# Patient Record
Sex: FEMALE | Race: BLACK OR AFRICAN AMERICAN | NOT HISPANIC OR LATINO | URBAN - METROPOLITAN AREA
[De-identification: names, ages, dates, MRNs, and addresses within clinical notes are randomized per-mention and may not be internally consistent; named-entity substitution may affect disease eponyms.]

---

## 2017-01-05 PROBLEM — Z00.00 ENCOUNTER FOR PREVENTIVE HEALTH EXAMINATION: Status: ACTIVE | Noted: 2017-01-05

## 2017-12-15 ENCOUNTER — INPATIENT (INPATIENT)
Facility: HOSPITAL | Age: 82
LOS: 3 days | Discharge: HOME | DRG: 571 | End: 2017-12-19
Attending: INTERNAL MEDICINE | Admitting: INTERNAL MEDICINE
Payer: MEDICARE

## 2017-12-15 VITALS
OXYGEN SATURATION: 97 % | HEART RATE: 76 BPM | RESPIRATION RATE: 18 BRPM | TEMPERATURE: 100 F | SYSTOLIC BLOOD PRESSURE: 159 MMHG | DIASTOLIC BLOOD PRESSURE: 94 MMHG

## 2017-12-15 DIAGNOSIS — L03.119 CELLULITIS OF UNSPECIFIED PART OF LIMB: ICD-10-CM

## 2017-12-15 DIAGNOSIS — I48.91 UNSPECIFIED ATRIAL FIBRILLATION: ICD-10-CM

## 2017-12-15 DIAGNOSIS — I10 ESSENTIAL (PRIMARY) HYPERTENSION: ICD-10-CM

## 2017-12-15 DIAGNOSIS — L03.90 CELLULITIS, UNSPECIFIED: ICD-10-CM

## 2017-12-15 LAB
ALBUMIN SERPL ELPH-MCNC: 3.9 G/DL — SIGNIFICANT CHANGE UP (ref 3.3–5)
ALP SERPL-CCNC: 98 U/L — SIGNIFICANT CHANGE UP (ref 40–120)
ALT FLD-CCNC: 6 U/L RC — LOW (ref 10–45)
ANION GAP SERPL CALC-SCNC: 14 MMOL/L — SIGNIFICANT CHANGE UP (ref 5–17)
AST SERPL-CCNC: 14 U/L — SIGNIFICANT CHANGE UP (ref 10–40)
BASE EXCESS BLDV CALC-SCNC: 0.1 MMOL/L — SIGNIFICANT CHANGE UP (ref -2–2)
BASOPHILS # BLD AUTO: 0.1 K/UL — SIGNIFICANT CHANGE UP (ref 0–0.2)
BASOPHILS NFR BLD AUTO: 0.6 % — SIGNIFICANT CHANGE UP (ref 0–2)
BILIRUB SERPL-MCNC: 0.6 MG/DL — SIGNIFICANT CHANGE UP (ref 0.2–1.2)
BUN SERPL-MCNC: 14 MG/DL — SIGNIFICANT CHANGE UP (ref 7–23)
CA-I SERPL-SCNC: 1.23 MMOL/L — SIGNIFICANT CHANGE UP (ref 1.12–1.3)
CALCIUM SERPL-MCNC: 9.7 MG/DL — SIGNIFICANT CHANGE UP (ref 8.4–10.5)
CHLORIDE BLDV-SCNC: 102 MMOL/L — SIGNIFICANT CHANGE UP (ref 96–108)
CHLORIDE SERPL-SCNC: 100 MMOL/L — SIGNIFICANT CHANGE UP (ref 96–108)
CO2 BLDV-SCNC: 26 MMOL/L — SIGNIFICANT CHANGE UP (ref 22–30)
CO2 SERPL-SCNC: 23 MMOL/L — SIGNIFICANT CHANGE UP (ref 22–31)
CREAT SERPL-MCNC: 0.99 MG/DL — SIGNIFICANT CHANGE UP (ref 0.5–1.3)
EOSINOPHIL # BLD AUTO: 0 K/UL — SIGNIFICANT CHANGE UP (ref 0–0.5)
EOSINOPHIL NFR BLD AUTO: 0.1 % — SIGNIFICANT CHANGE UP (ref 0–6)
GAS PNL BLDV: 138 MMOL/L — SIGNIFICANT CHANGE UP (ref 136–145)
GAS PNL BLDV: SIGNIFICANT CHANGE UP
GLUCOSE BLDV-MCNC: 117 MG/DL — HIGH (ref 70–99)
GLUCOSE SERPL-MCNC: 114 MG/DL — HIGH (ref 70–99)
HCO3 BLDV-SCNC: 25 MMOL/L — SIGNIFICANT CHANGE UP (ref 21–29)
HCT VFR BLD CALC: 40.6 % — SIGNIFICANT CHANGE UP (ref 34.5–45)
HCT VFR BLDA CALC: 38 % — LOW (ref 39–50)
HGB BLD CALC-MCNC: 12.4 G/DL — SIGNIFICANT CHANGE UP (ref 11.5–15.5)
HGB BLD-MCNC: 12.8 G/DL — SIGNIFICANT CHANGE UP (ref 11.5–15.5)
LACTATE BLDV-MCNC: 2.2 MMOL/L — HIGH (ref 0.7–2)
LYMPHOCYTES # BLD AUTO: 1.2 K/UL — SIGNIFICANT CHANGE UP (ref 1–3.3)
LYMPHOCYTES # BLD AUTO: 15 % — SIGNIFICANT CHANGE UP (ref 13–44)
MCHC RBC-ENTMCNC: 27.4 PG — SIGNIFICANT CHANGE UP (ref 27–34)
MCHC RBC-ENTMCNC: 31.6 GM/DL — LOW (ref 32–36)
MCV RBC AUTO: 86.7 FL — SIGNIFICANT CHANGE UP (ref 80–100)
MONOCYTES # BLD AUTO: 0.7 K/UL — SIGNIFICANT CHANGE UP (ref 0–0.9)
MONOCYTES NFR BLD AUTO: 8.7 % — SIGNIFICANT CHANGE UP (ref 2–14)
NEUTROPHILS # BLD AUTO: 6.3 K/UL — SIGNIFICANT CHANGE UP (ref 1.8–7.4)
NEUTROPHILS NFR BLD AUTO: 75.5 % — SIGNIFICANT CHANGE UP (ref 43–77)
PCO2 BLDV: 44 MMHG — SIGNIFICANT CHANGE UP (ref 35–50)
PH BLDV: 7.37 — SIGNIFICANT CHANGE UP (ref 7.35–7.45)
PLATELET # BLD AUTO: 170 K/UL — SIGNIFICANT CHANGE UP (ref 150–400)
PO2 BLDV: 43 MMHG — SIGNIFICANT CHANGE UP (ref 25–45)
POTASSIUM BLDV-SCNC: 5.3 MMOL/L — HIGH (ref 3.5–5)
POTASSIUM SERPL-MCNC: 4.7 MMOL/L — SIGNIFICANT CHANGE UP (ref 3.5–5.3)
POTASSIUM SERPL-SCNC: 4.7 MMOL/L — SIGNIFICANT CHANGE UP (ref 3.5–5.3)
PROT SERPL-MCNC: 8 G/DL — SIGNIFICANT CHANGE UP (ref 6–8.3)
RBC # BLD: 4.68 M/UL — SIGNIFICANT CHANGE UP (ref 3.8–5.2)
RBC # FLD: 12.5 % — SIGNIFICANT CHANGE UP (ref 10.3–14.5)
SAO2 % BLDV: 72 % — SIGNIFICANT CHANGE UP (ref 67–88)
SODIUM SERPL-SCNC: 137 MMOL/L — SIGNIFICANT CHANGE UP (ref 135–145)
WBC # BLD: 8.3 K/UL — SIGNIFICANT CHANGE UP (ref 3.8–10.5)
WBC # FLD AUTO: 8.3 K/UL — SIGNIFICANT CHANGE UP (ref 3.8–10.5)

## 2017-12-15 PROCEDURE — 93971 EXTREMITY STUDY: CPT | Mod: 26

## 2017-12-15 PROCEDURE — 99285 EMERGENCY DEPT VISIT HI MDM: CPT | Mod: GC

## 2017-12-15 PROCEDURE — 73590 X-RAY EXAM OF LOWER LEG: CPT | Mod: 26,LT

## 2017-12-15 RX ORDER — ACETAMINOPHEN 500 MG
650 TABLET ORAL ONCE
Qty: 0 | Refills: 0 | Status: COMPLETED | OUTPATIENT
Start: 2017-12-15 | End: 2017-12-15

## 2017-12-15 RX ORDER — SPIRONOLACTONE 25 MG/1
1 TABLET, FILM COATED ORAL
Qty: 0 | Refills: 0 | COMMUNITY

## 2017-12-15 RX ORDER — SPIRONOLACTONE 25 MG/1
25 TABLET, FILM COATED ORAL DAILY
Qty: 0 | Refills: 0 | Status: DISCONTINUED | OUTPATIENT
Start: 2017-12-15 | End: 2017-12-19

## 2017-12-15 RX ORDER — BISOPROLOL FUMARATE 10 MG/1
1 TABLET, FILM COATED ORAL
Qty: 0 | Refills: 0 | COMMUNITY

## 2017-12-15 RX ORDER — BISOPROLOL FUMARATE 10 MG/1
0 TABLET, FILM COATED ORAL
Qty: 0 | Refills: 0 | COMMUNITY

## 2017-12-15 RX ORDER — RIVAROXABAN 15 MG-20MG
1 KIT ORAL
Qty: 0 | Refills: 0 | COMMUNITY

## 2017-12-15 RX ORDER — ACETAMINOPHEN 500 MG
2 TABLET ORAL
Qty: 0 | Refills: 0 | COMMUNITY

## 2017-12-15 RX ORDER — SPIRONOLACTONE 25 MG/1
0 TABLET, FILM COATED ORAL
Qty: 0 | Refills: 0 | COMMUNITY

## 2017-12-15 RX ORDER — RIVAROXABAN 15 MG-20MG
0 KIT ORAL
Qty: 0 | Refills: 0 | COMMUNITY

## 2017-12-15 RX ORDER — BRINZOLAMIDE/BRIMONIDINE TARTRATE 10; 2 MG/ML; MG/ML
1 SUSPENSION/ DROPS OPHTHALMIC
Qty: 0 | Refills: 0 | COMMUNITY

## 2017-12-15 RX ORDER — RIVAROXABAN 15 MG-20MG
20 KIT ORAL EVERY 24 HOURS
Qty: 0 | Refills: 0 | Status: DISCONTINUED | OUTPATIENT
Start: 2017-12-15 | End: 2017-12-19

## 2017-12-15 RX ORDER — ACETAMINOPHEN 500 MG
650 TABLET ORAL EVERY 6 HOURS
Qty: 0 | Refills: 0 | Status: DISCONTINUED | OUTPATIENT
Start: 2017-12-15 | End: 2017-12-19

## 2017-12-15 RX ORDER — BISOPROLOL FUMARATE 10 MG/1
10 TABLET, FILM COATED ORAL DAILY
Qty: 0 | Refills: 0 | Status: DISCONTINUED | OUTPATIENT
Start: 2017-12-15 | End: 2017-12-19

## 2017-12-15 RX ADMIN — Medication 100 MILLIGRAM(S): at 17:29

## 2017-12-15 RX ADMIN — Medication 650 MILLIGRAM(S): at 22:20

## 2017-12-15 RX ADMIN — RIVAROXABAN 20 MILLIGRAM(S): KIT at 23:25

## 2017-12-15 NOTE — ED ADULT NURSE NOTE - OBJECTIVE STATEMENT
83 yo female presents to ED with c/o left lower leg cellulitis, sent by PMD. Patient is A&O x3. Patient denies chest pain and SOB, fevers, chills, N/V/D. Abdomen is soft non tender, non distended. Patient reports hitting her left lower leg 3 weeks ago and developing a "black and blue" bruise. She states the bruise has evolved into the following presentation over 3 weeks: LLE is red, warm to touch with +2 pitting edema. There is a yellow and red fluid-filled blister on the anterior surface that she states "pops and refills again" daily. +2 pedal pulses present bilaterally. Capillary refill <2 seconds in the feet bilaterally. Skin is warm and dry, color appropriate for ethnicity. Patient reports 5/10 pain in the left foot. There is no acute distress at this time. Patients grandson is at the bedside. Safety and comfort maintained. will continue to monitor.

## 2017-12-15 NOTE — ED PROVIDER NOTE - SKIN WOUND TYPE
approx 6cm area of abrasion, erythema surrounding it, over anterior left shin; warm to touch, no acitve drainage/ABRASION(S)

## 2017-12-15 NOTE — ED PROCEDURE NOTE - PROCEDURE ADDITIONAL DETAILS
Attempted to perform LLE DVT study, was unable to visualize popliteal region (extensive swelling / edema and obese leg)  -Jerzy

## 2017-12-15 NOTE — H&P ADULT - ASSESSMENT
81yo female history of afib on xarelto, hypertension p/w left lower extremity erythema since tuesday. Pt. reports bruising left leg with a box. Pt. on levofloxacin since Wednesday by Dr. Reza, seen by PMD(Dr. Wesley friedman today and referred to ED. Pt. reports pain to the site with yellow drainage. Denies fevers, chest pain, sob, abdominal pain. Pt. reports pain with walking. Pt. taking tylenol with mild relief.

## 2017-12-15 NOTE — ED PROVIDER NOTE - OBJECTIVE STATEMENT
83yo female history of afib on xarelto, hypertension p/w left lower extremity erythema since tuesday. Pt. reports bruising left leg with a box. Pt. on levofloxacin since Wednesday by Dr. Reza, seen by PMD(Dr. Wesley friedman today and referred to ED. Pt. reports pain to the site with yellow drainage. Denies fevers, chest pain, sob, abdominal pain. Pt. reports pain with walking. Pt. taking tylenol with mild relief.

## 2017-12-15 NOTE — ED PROVIDER NOTE - MEDICAL DECISION MAKING DETAILS
81yo F presenting with cellulitis of the LLE failing outpatient ABx; will admit for wound care, iv antibiotics and further evaluation/management.

## 2017-12-15 NOTE — ED ADULT NURSE REASSESSMENT NOTE - NS ED NURSE REASSESS COMMENT FT1
Patient sitting up in bed. She returned from ultrasound. Family is at bedside. She is a&ox4. She is c/o left lower leg pain. On assessment, she has large area of redness with would to left shin anteriorly. Wound feels warm. Has abdominal pad placed on top of wound. Vitals are stable. She received bed, pending transport to room.

## 2017-12-15 NOTE — H&P ADULT - PROBLEM SELECTOR PLAN 1
IV abxs Clindamycin started in ED and will continue for now . ID consulted to help with Abxs . Wound care consulted. Doppler and xrays noted.

## 2017-12-15 NOTE — ED PROVIDER NOTE - ATTENDING CONTRIBUTION TO CARE
83yo F presenting with cellulitis of the LLE failing outpatient ABx; will admit for wound care, iv antibiotics and further evaluation/management.

## 2017-12-15 NOTE — PATIENT PROFILE ADULT. - TEACHING/LEARNING FACTORS INFLUENCE READINESS TO LEARN
Face to face report given with opportunity to observe patient.    Report given to Milvia LEROY and Belinda Baldwin   9/22/2017  7:36 PM       acuteness of illness

## 2017-12-16 LAB
ANION GAP SERPL CALC-SCNC: 11 MMOL/L — SIGNIFICANT CHANGE UP (ref 5–17)
BUN SERPL-MCNC: 13 MG/DL — SIGNIFICANT CHANGE UP (ref 7–23)
CALCIUM SERPL-MCNC: 9.8 MG/DL — SIGNIFICANT CHANGE UP (ref 8.4–10.5)
CHLORIDE SERPL-SCNC: 102 MMOL/L — SIGNIFICANT CHANGE UP (ref 96–108)
CO2 SERPL-SCNC: 23 MMOL/L — SIGNIFICANT CHANGE UP (ref 22–31)
CREAT SERPL-MCNC: 1.03 MG/DL — SIGNIFICANT CHANGE UP (ref 0.5–1.3)
GLUCOSE BLDC GLUCOMTR-MCNC: 153 MG/DL — HIGH (ref 70–99)
GLUCOSE SERPL-MCNC: 95 MG/DL — SIGNIFICANT CHANGE UP (ref 70–99)
HCT VFR BLD CALC: 37.1 % — SIGNIFICANT CHANGE UP (ref 34.5–45)
HGB BLD-MCNC: 11.3 G/DL — LOW (ref 11.5–15.5)
LACTATE SERPL-SCNC: 1.5 MMOL/L — SIGNIFICANT CHANGE UP (ref 0.7–2)
MCHC RBC-ENTMCNC: 25.3 PG — LOW (ref 27–34)
MCHC RBC-ENTMCNC: 30.5 GM/DL — LOW (ref 32–36)
MCV RBC AUTO: 83.2 FL — SIGNIFICANT CHANGE UP (ref 80–100)
PLATELET # BLD AUTO: 176 K/UL — SIGNIFICANT CHANGE UP (ref 150–400)
POTASSIUM SERPL-MCNC: 4.7 MMOL/L — SIGNIFICANT CHANGE UP (ref 3.5–5.3)
POTASSIUM SERPL-SCNC: 4.7 MMOL/L — SIGNIFICANT CHANGE UP (ref 3.5–5.3)
RBC # BLD: 4.46 M/UL — SIGNIFICANT CHANGE UP (ref 3.8–5.2)
RBC # FLD: 14 % — SIGNIFICANT CHANGE UP (ref 10.3–14.5)
SODIUM SERPL-SCNC: 136 MMOL/L — SIGNIFICANT CHANGE UP (ref 135–145)
WBC # BLD: 6.92 K/UL — SIGNIFICANT CHANGE UP (ref 3.8–10.5)
WBC # FLD AUTO: 6.92 K/UL — SIGNIFICANT CHANGE UP (ref 3.8–10.5)

## 2017-12-16 RX ORDER — TRAMADOL HYDROCHLORIDE 50 MG/1
25 TABLET ORAL ONCE
Qty: 0 | Refills: 0 | Status: DISCONTINUED | OUTPATIENT
Start: 2017-12-16 | End: 2017-12-16

## 2017-12-16 RX ORDER — VANCOMYCIN HCL 1 G
1000 VIAL (EA) INTRAVENOUS EVERY 12 HOURS
Qty: 0 | Refills: 0 | Status: DISCONTINUED | OUTPATIENT
Start: 2017-12-16 | End: 2017-12-16

## 2017-12-16 RX ORDER — LACTOBACILLUS ACIDOPHILUS 100MM CELL
1 CAPSULE ORAL DAILY
Qty: 0 | Refills: 0 | Status: DISCONTINUED | OUTPATIENT
Start: 2017-12-16 | End: 2017-12-19

## 2017-12-16 RX ORDER — VANCOMYCIN HCL 1 G
1000 VIAL (EA) INTRAVENOUS EVERY 24 HOURS
Qty: 0 | Refills: 0 | Status: DISCONTINUED | OUTPATIENT
Start: 2017-12-16 | End: 2017-12-18

## 2017-12-16 RX ADMIN — Medication 250 MILLIGRAM(S): at 22:06

## 2017-12-16 RX ADMIN — RIVAROXABAN 20 MILLIGRAM(S): KIT at 19:08

## 2017-12-16 RX ADMIN — SPIRONOLACTONE 25 MILLIGRAM(S): 25 TABLET, FILM COATED ORAL at 04:43

## 2017-12-16 RX ADMIN — Medication 100 MILLIGRAM(S): at 03:56

## 2017-12-16 RX ADMIN — BISOPROLOL FUMARATE 10 MILLIGRAM(S): 10 TABLET, FILM COATED ORAL at 04:43

## 2017-12-16 RX ADMIN — Medication 1 TABLET(S): at 19:09

## 2017-12-16 RX ADMIN — Medication 650 MILLIGRAM(S): at 19:39

## 2017-12-16 RX ADMIN — TRAMADOL HYDROCHLORIDE 25 MILLIGRAM(S): 50 TABLET ORAL at 23:10

## 2017-12-16 RX ADMIN — Medication 650 MILLIGRAM(S): at 19:09

## 2017-12-16 NOTE — PROGRESS NOTE ADULT - SUBJECTIVE AND OBJECTIVE BOX
INTERVAL HPI/OVERNIGHT EVENTS: Seen and examined .  Vital Signs Last 24 Hrs  T(C): 36.8 (16 Dec 2017 04:36), Max: 37.5 (15 Dec 2017 15:45)  T(F): 98.2 (16 Dec 2017 04:36), Max: 99.5 (15 Dec 2017 15:45)  HR: 84 (16 Dec 2017 04:36) (76 - 91)  BP: 142/71 (16 Dec 2017 04:36) (131/90 - 171/68)  BP(mean): --  RR: 18 (16 Dec 2017 04:36) (18 - 19)  SpO2: 99% (16 Dec 2017 04:36) (97% - 99%)  I&O's Summary    16 Dec 2017 07:01  -  16 Dec 2017 13:45  --------------------------------------------------------  IN: 240 mL / OUT: 0 mL / NET: 240 mL      MEDICATIONS  (STANDING):  bisoprolol   Tablet 10 milliGRAM(s) Oral daily  rivaroxaban 20 milliGRAM(s) Oral every 24 hours  spironolactone 25 milliGRAM(s) Oral daily  torsemide 10 milliGRAM(s) Oral daily    MEDICATIONS  (PRN):  acetaminophen   Tablet. 650 milliGRAM(s) Oral every 6 hours PRN Moderate Pain (4 - 6)    LABS:                        12.8   8.3   )-----------( 170      ( 15 Dec 2017 17:09 )             40.6     12-15    137  |  100  |  14  ----------------------------<  114<H>  4.7   |  23  |  0.99    Ca    9.7      15 Dec 2017 17:09    TPro  8.0  /  Alb  3.9  /  TBili  0.6  /  DBili  x   /  AST  14  /  ALT  6<L>  /  AlkPhos  98  12-15        CAPILLARY BLOOD GLUCOSE              REVIEW OF SYSTEMS:  CONSTITUTIONAL: No fever, weight loss, or fatigue  EYES: No eye pain, visual disturbances, or discharge  ENMT:  No difficulty hearing, tinnitus, vertigo; No sinus or throat pain  NECK: No pain or stiffness  BREASTS: No pain, masses, or nipple discharge  RESPIRATORY: No cough, wheezing, chills or hemoptysis; No shortness of breath  CARDIOVASCULAR: No chest pain, palpitations, dizziness, or leg swelling  GASTROINTESTINAL: No abdominal or epigastric pain. No nausea, vomiting, or hematemesis; No diarrhea or constipation. No melena or hematochezia.  GENITOURINARY: No dysuria, frequency, hematuria, or incontinence  NEUROLOGICAL: No headaches, memory loss, loss of strength, numbness, or tremors  SKIN: No itching, burning, rashes, or lesions   LYMPH NODES: No enlarged glands  ENDOCRINE: No heat or cold intolerance; No hair loss  MUSCULOSKELETAL: No joint pain or swelling; No muscle, back, or extremity pain  PSYCHIATRIC: No depression, anxiety, mood swings, or difficulty sleeping  HEME/LYMPH: No easy bruising, or bleeding gums  ALLERY AND IMMUNOLOGIC: No hives or eczema    RADIOLOGY & ADDITIONAL TESTS:    Consultant(s) Notes Reviewed:  [x ] YES  [ ] NO    PHYSICAL EXAM:  GENERAL: NAD, well-groomed, well-developed, not in any distress ,  HEAD:  Atraumatic, Normocephalic  EYES: EOMI, PERRLA, conjunctiva and sclera clear  ENMT: No tonsillar erythema, exudates, or enlargement; Moist mucous membranes, Good dentition, No lesions  NECK: Supple, No JVD, Normal thyroid  NERVOUS SYSTEM:  Alert & Oriented X3, No focal deficit   CHEST/LUNG: Good air entry bilateral with no  rales, rhonchi, wheezing, or rubs  HEART: Regular rate and rhythm; No murmurs, rubs, or gallops  ABDOMEN: Soft, Nontender, Nondistended; Bowel sounds present  EXTREMITIES:  leg swelling ,redness little better but bullous +    Care Discussed with Consultants/Other Providers [ x] YES  [ ] NO

## 2017-12-16 NOTE — PROGRESS NOTE ADULT - ASSESSMENT
83yo female history of afib on xarelto, hypertension p/w left lower extremity erythema since tuesday. Pt. reports bruising left leg with a box. Pt. on levofloxacin since Wednesday by Dr. Reza, seen by PMD(Dr. Wesley friedman today and referred to ED. Pt. reports pain to the site with yellow drainage. Denies fevers, chest pain, sob, abdominal pain. Pt. reports pain with walking. Pt. taking tylenol with mild relief.     Problem/Plan - 1:  ·  Problem: Cellulitis of lower extremity, unspecified laterality, Cant R/O abscess .  Plan: IV abxs Clindamycin started in ED and will continue for now . ID consulted to help with Abxs . Wound care consulted. Doppler and xrays noted.      Problem/Plan - 2:  ·  Problem: Hypertension.  Plan: BP meds.      Problem/Plan - 3:  ·  Problem: Atrial fibrillation with controlled ventricular response.  Plan: On Xarelto.

## 2017-12-16 NOTE — CONSULT NOTE ADULT - SUBJECTIVE AND OBJECTIVE BOX
Patient is a 82y old  Female who presents with a chief complaint of left leg injury with drainage (15 Dec 2017 22:25)      HPI:  83yo female history of afib on xarelto, hypertension p/w left lower extremity erythema since tuesday. Pt. reports bruising left leg with a box. Pt. on levofloxacin since Wednesday by Dr. Friedman, seen by PMD (Dr. Wesley friedman today and referred to ED)  Pt. reports pain to the site with yellow drainage. Denies fevers, chest pain, sob, abdominal pain. Pt. reports pain with walking. Pt. taking tylenol with mild relief. (15 Dec 2017 22:25)    In the ER vitals were: T 99.5, P 76, BP  156/94.  Pt had normal WBC and lactate on admission.  No evidence of DVT on dopplers or radiographic signs of OM on xray.      ID consult was called for further antibiotic managment.    REVIEW OF SYSTEMS:    CONSTITUTIONAL: No fever, weight loss, or fatigue  EYES: No eye pain, visual disturbances, or discharge  ENMT:  No sore throat  NECK: No pain or stiffness  RESPIRATORY: No cough, wheezing, chills or hemoptysis; No shortness of breath  CARDIOVASCULAR: No chest pain, palpitations, dizziness, or leg swelling  GASTROINTESTINAL: No abdominal or epigastric pain. No nausea, vomiting, or hematemesis; No diarrhea or constipation. No melena or hematochezia.  GENITOURINARY: No dysuria, frequency, hematuria, or incontinence  NEUROLOGICAL: No headaches, memory loss, loss of strength, numbness, or tremors  SKIN: No itching, burning, rashes, or lesions   LYMPH NODES: No enlarged glands  MUSCULOSKELETAL: No joint pain or swelling; No muscle, back, or extremity pain      PAST MEDICAL & SURGICAL HISTORY:  Hypertension  Aortic aneurysm: Ascending aortic aneurysm Repair done in 2011 [place]/ uncomplicated post-op course, no Ct scan follow up  Atrial fibrillation: unsuccessful Cardioversion 2 yrs ago  Benign essential HTN: 40 yrs      Allergies    No Known Allergies    Intolerances        FAMILY HISTORY:  No relevant family history.    SOCIAL HISTORY:        MEDICATIONS  (STANDING):  bisoprolol   Tablet 10 milliGRAM(s) Oral daily  rivaroxaban 20 milliGRAM(s) Oral every 24 hours  spironolactone 25 milliGRAM(s) Oral daily  torsemide 10 milliGRAM(s) Oral daily    MEDICATIONS  (PRN):  acetaminophen   Tablet. 650 milliGRAM(s) Oral every 6 hours PRN Moderate Pain (4 - 6)      Vital Signs Last 24 Hrs  T(C): 36.8 (16 Dec 2017 04:36), Max: 37.5 (15 Dec 2017 15:45)  T(F): 98.2 (16 Dec 2017 04:36), Max: 99.5 (15 Dec 2017 15:45)  HR: 84 (16 Dec 2017 04:36) (76 - 91)  BP: 142/71 (16 Dec 2017 04:36) (131/90 - 171/68)  BP(mean): --  RR: 18 (16 Dec 2017 04:36) (18 - 19)  SpO2: 99% (16 Dec 2017 04:36) (97% - 99%)    PHYSICAL EXAM:    GENERAL: NAD, well-groomed  HEAD:  Atraumatic, Normocephalic  EYES: EOMI, PERRLA, conjunctiva and sclera clear  ENMT: No tonsillar erythema, exudates, or enlargement; Moist mucous membranes  NECK: Supple, No JVD  CHEST/LUNG: Clear to percussion bilaterally; No rales, rhonchi, wheezing, or rubs  HEART: Regular rate and rhythm; No murmurs, rubs, or gallops  ABDOMEN: Soft, Nontender, Nondistended; Bowel sounds present  EXTREMITIES:  2+ Peripheral Pulses, No clubbing, cyanosis, or edema  LYMPH: No lymphadenopathy noted  SKIN: No rashes or lesions    LABS:  CBC Full  -  ( 15 Dec 2017 17:09 )  WBC Count : 8.3 K/uL  Hemoglobin : 12.8 g/dL  Hematocrit : 40.6 %  Platelet Count - Automated : 170 K/uL  Mean Cell Volume : 86.7 fl  Mean Cell Hemoglobin : 27.4 pg  Mean Cell Hemoglobin Concentration : 31.6 gm/dL  Auto Neutrophil # : 6.3 K/uL  Auto Lymphocyte # : 1.2 K/uL  Auto Monocyte # : 0.7 K/uL  Auto Eosinophil # : 0.0 K/uL  Auto Basophil # : 0.1 K/uL  Auto Neutrophil % : 75.5 %  Auto Lymphocyte % : 15.0 %  Auto Monocyte % : 8.7 %  Auto Eosinophil % : 0.1 %  Auto Basophil % : 0.6 %      12-15    137  |  100  |  14  ----------------------------<  114<H>  4.7   |  23  |  0.99    Ca    9.7      15 Dec 2017 17:09    TPro  8.0  /  Alb  3.9  /  TBili  0.6  /  DBili  x   /  AST  14  /  ALT  6<L>  /  AlkPhos  98  12-15      LIVER FUNCTIONS - ( 15 Dec 2017 17:09 )  Alb: 3.9 g/dL / Pro: 8.0 g/dL / ALK PHOS: 98 U/L / ALT: 6 U/L RC / AST: 14 U/L / GGT: x                   MICROBIOLOGY:            RADIOLOGY:    < from: [facility] Duplex Lower Ext Vein Scan, Left (12.15.17 @ 19:19) >  FINDINGS:    There is normal compressibility of the left common femoral, femoral and   popliteal veins. No calf vein thrombosis is detected.    Thecontralateral common femoral vein is patent.    Doppler examination shows normal spontaneous and phasic flow.    Edema in the left popliteal fossa and calf.    IMPRESSION:     No evidence of left lower extremity deep venous thrombosis.    < end of copied text >    < from: Xray Tibia + Fibula 2 Views, Left (12.15.17 @ 17:01) >  FINDINGS:     No cortical erosion, periosteal reaction, to suggest ostomy myelitis.  No acute fracture. No dislocation. Severe tricompartmental arthrosis of   the knee greatest in the medial compartment with a mild lateral   subluxation of the tibia relative to the femur and with a minimal genu   varus deformity.  Significant overlying diffuse soft tissue swelling.   Large plantar calcaneal spur.    IMPRESSION:     No radiographic evidence of osteomyelitis.If clinical concern persists,   cross-sectional imaging may be obtained for further evaluation.    < end of copied text > Patient is a 82y old  Female who presents with a chief complaint of left leg injury with drainage (15 Dec 2017 22:25)      HPI:  81yo female history of afib on xarelto, hypertension p/w left lower extremity erythema since tuesday. Pt. reports bruising left leg with a box. Pt. on levofloxacin since Wednesday by Dr. Friedman, seen by PMD (Dr. Wesley friedman today and referred to ED)  Pt. reports pain to the site with yellow drainage. Denies fevers, chest pain, sob, abdominal pain. Pt. reports pain with walking. Pt. taking tylenol with mild relief. (15 Dec 2017 22:25)    In the ER vitals were: T 99.5, P 76, BP  156/94.  Pt had normal WBC and lactate on admission.  No evidence of DVT on dopplers or radiographic signs of OM on xray.   Pt with large pus filled blister on anterior shin.  States has been having leakage from blister area.    ID consult was called for further antibiotic managment.    REVIEW OF SYSTEMS:    CONSTITUTIONAL: No fever, weight loss, or fatigue  EYES: No eye pain, visual disturbances, or discharge  ENMT:  No sore throat  NECK: No pain or stiffness  RESPIRATORY: No cough, wheezing, chills or hemoptysis; No shortness of breath  CARDIOVASCULAR: No chest pain, palpitations, dizziness, or leg swelling  GASTROINTESTINAL: No abdominal or epigastric pain. No nausea, vomiting, or hematemesis; No diarrhea or constipation. No melena or hematochezia.  GENITOURINARY: No dysuria, frequency, hematuria, or incontinence  NEUROLOGICAL: No headaches, memory loss, loss of strength, numbness, or tremors  SKIN: Large pus/blood filled blister L anterior shin  LYMPH NODES: No enlarged glands  MUSCULOSKELETAL: No joint pain or swelling; No muscle, back, or extremity pain      PAST MEDICAL & SURGICAL HISTORY:  Hypertension  Aortic aneurysm: Ascending aortic aneurysm Repair done in 2011 [place]/ uncomplicated post-op course, no Ct scan follow up  Atrial fibrillation: unsuccessful Cardioversion 2 yrs ago  Benign essential HTN: 40 yrs      Allergies    No Known Allergies    Intolerances        FAMILY HISTORY:  No relevant family history.    SOCIAL HISTORY:  No etoh, ivdu, smoking      MEDICATIONS  (STANDING):  bisoprolol   Tablet 10 milliGRAM(s) Oral daily  rivaroxaban 20 milliGRAM(s) Oral every 24 hours  spironolactone 25 milliGRAM(s) Oral daily  torsemide 10 milliGRAM(s) Oral daily    MEDICATIONS  (PRN):  acetaminophen   Tablet. 650 milliGRAM(s) Oral every 6 hours PRN Moderate Pain (4 - 6)      Vital Signs Last 24 Hrs  T(C): 36.8 (16 Dec 2017 04:36), Max: 37.5 (15 Dec 2017 15:45)  T(F): 98.2 (16 Dec 2017 04:36), Max: 99.5 (15 Dec 2017 15:45)  HR: 84 (16 Dec 2017 04:36) (76 - 91)  BP: 142/71 (16 Dec 2017 04:36) (131/90 - 171/68)  BP(mean): --  RR: 18 (16 Dec 2017 04:36) (18 - 19)  SpO2: 99% (16 Dec 2017 04:36) (97% - 99%)    PHYSICAL EXAM:    GENERAL: NAD, well-groomed  HEAD:  Atraumatic, Normocephalic  EYES: EOMI, PERRLA, conjunctiva and sclera clear  ENMT: No tonsillar erythema, exudates, or enlargement; Moist mucous membranes  NECK: Supple, No JVD  CHEST/LUNG: Clear to percussion bilaterally; No rales, rhonchi, wheezing, or rubs  HEART: Regular rate and rhythm; No murmurs, rubs, or gallops  ABDOMEN: Soft, Nontender, Nondistended; Bowel sounds present  EXTREMITIES:  2+ Peripheral Pulses, No clubbing, cyanosis, or edema  LYMPH: No lymphadenopathy noted  SKIN: No rashes or lesions    LABS:  CBC Full  -  ( 15 Dec 2017 17:09 )  WBC Count : 8.3 K/uL  Hemoglobin : 12.8 g/dL  Hematocrit : 40.6 %  Platelet Count - Automated : 170 K/uL  Mean Cell Volume : 86.7 fl  Mean Cell Hemoglobin : 27.4 pg  Mean Cell Hemoglobin Concentration : 31.6 gm/dL  Auto Neutrophil # : 6.3 K/uL  Auto Lymphocyte # : 1.2 K/uL  Auto Monocyte # : 0.7 K/uL  Auto Eosinophil # : 0.0 K/uL  Auto Basophil # : 0.1 K/uL  Auto Neutrophil % : 75.5 %  Auto Lymphocyte % : 15.0 %  Auto Monocyte % : 8.7 %  Auto Eosinophil % : 0.1 %  Auto Basophil % : 0.6 %      12-15    137  |  100  |  14  ----------------------------<  114<H>  4.7   |  23  |  0.99    Ca    9.7      15 Dec 2017 17:09    TPro  8.0  /  Alb  3.9  /  TBili  0.6  /  DBili  x   /  AST  14  /  ALT  6<L>  /  AlkPhos  98  12-15      LIVER FUNCTIONS - ( 15 Dec 2017 17:09 )  Alb: 3.9 g/dL / Pro: 8.0 g/dL / ALK PHOS: 98 U/L / ALT: 6 U/L RC / AST: 14 U/L / GGT: x                   MICROBIOLOGY:            RADIOLOGY:    < from: [facility] Duplex Lower Ext Vein Scan, Left (12.15.17 @ 19:19) >  FINDINGS:    There is normal compressibility of the left common femoral, femoral and   popliteal veins. No calf vein thrombosis is detected.    Thecontralateral common femoral vein is patent.    Doppler examination shows normal spontaneous and phasic flow.    Edema in the left popliteal fossa and calf.    IMPRESSION:     No evidence of left lower extremity deep venous thrombosis.    < end of copied text >    < from: Xray Tibia + Fibula 2 Views, Left (12.15.17 @ 17:01) >  FINDINGS:     No cortical erosion, periosteal reaction, to suggest ostomy myelitis.  No acute fracture. No dislocation. Severe tricompartmental arthrosis of   the knee greatest in the medial compartment with a mild lateral   subluxation of the tibia relative to the femur and with a minimal genu   varus deformity.  Significant overlying diffuse soft tissue swelling.   Large plantar calcaneal spur.    IMPRESSION:     No radiographic evidence of osteomyelitis.If clinical concern persists,   cross-sectional imaging may be obtained for further evaluation.    < end of copied text >

## 2017-12-16 NOTE — CONSULT NOTE ADULT - ASSESSMENT
Recommend:    - Pt needs I & D of abscess.  Send cultures.  Recommend surgery evaluation.    - Change from clindamycin to  vancomycin to cover MRSA.  F/u ID and sensitivities.    - Check blood cultures.    Michaela Estrada  [phone] 81yo female history of afib on xarelto, hypertension p/w left lower extremity erythema since tuesday. Pt. reports bruising left leg with a box. Pt. on levofloxacin since Wednesday by Dr. Friedman, seen by PMD (Dr. Wesley friedman today and referred to ED)  Pt. reports pain to the site with yellow drainage. Denies fevers, chest pain, sob, abdominal pain. Pt. reports pain with walking. Pt. taking tylenol with mild relief. (15 Dec 2017 22:25)    In the ER vitals were: T 99.5, P 76, BP  156/94.  Pt had normal WBC and lactate on admission.  No evidence of DVT on dopplers or radiographic signs of OM on xray.   Pt with large pus filled blister on anterior shin.  States has been having leakage from blister area.          Recommend:    - Pt needs I & D of abscess.  Send cultures.  Recommend surgery evaluation.    - Change from clindamycin to  vancomycin to cover MRSA.  F/u ID and sensitivities.    - Check blood cultures.    Michaela Estrada  [phone]

## 2017-12-17 LAB
HCT VFR BLD CALC: 39.4 % — SIGNIFICANT CHANGE UP (ref 34.5–45)
HGB BLD-MCNC: 12.3 G/DL — SIGNIFICANT CHANGE UP (ref 11.5–15.5)
MCHC RBC-ENTMCNC: 27 PG — SIGNIFICANT CHANGE UP (ref 27–34)
MCHC RBC-ENTMCNC: 31.1 GM/DL — LOW (ref 32–36)
MCV RBC AUTO: 87 FL — SIGNIFICANT CHANGE UP (ref 80–100)
PLATELET # BLD AUTO: 183 K/UL — SIGNIFICANT CHANGE UP (ref 150–400)
RBC # BLD: 4.54 M/UL — SIGNIFICANT CHANGE UP (ref 3.8–5.2)
RBC # FLD: 12.6 % — SIGNIFICANT CHANGE UP (ref 10.3–14.5)
WBC # BLD: 7.8 K/UL — SIGNIFICANT CHANGE UP (ref 3.8–10.5)
WBC # FLD AUTO: 7.8 K/UL — SIGNIFICANT CHANGE UP (ref 3.8–10.5)

## 2017-12-17 RX ORDER — SENNA PLUS 8.6 MG/1
2 TABLET ORAL AT BEDTIME
Qty: 0 | Refills: 0 | Status: DISCONTINUED | OUTPATIENT
Start: 2017-12-17 | End: 2017-12-19

## 2017-12-17 RX ORDER — POLYETHYLENE GLYCOL 3350 17 G/17G
17 POWDER, FOR SOLUTION ORAL DAILY
Qty: 0 | Refills: 0 | Status: DISCONTINUED | OUTPATIENT
Start: 2017-12-17 | End: 2017-12-19

## 2017-12-17 RX ORDER — DOCUSATE SODIUM 100 MG
100 CAPSULE ORAL THREE TIMES A DAY
Qty: 0 | Refills: 0 | Status: DISCONTINUED | OUTPATIENT
Start: 2017-12-17 | End: 2017-12-19

## 2017-12-17 RX ADMIN — TRAMADOL HYDROCHLORIDE 25 MILLIGRAM(S): 50 TABLET ORAL at 00:10

## 2017-12-17 RX ADMIN — Medication 650 MILLIGRAM(S): at 20:29

## 2017-12-17 RX ADMIN — RIVAROXABAN 20 MILLIGRAM(S): KIT at 17:16

## 2017-12-17 RX ADMIN — POLYETHYLENE GLYCOL 3350 17 GRAM(S): 17 POWDER, FOR SOLUTION ORAL at 11:12

## 2017-12-17 RX ADMIN — Medication 100 MILLIGRAM(S): at 13:12

## 2017-12-17 RX ADMIN — Medication 1 TABLET(S): at 11:12

## 2017-12-17 RX ADMIN — Medication 650 MILLIGRAM(S): at 06:19

## 2017-12-17 RX ADMIN — Medication 650 MILLIGRAM(S): at 21:00

## 2017-12-17 RX ADMIN — Medication 250 MILLIGRAM(S): at 22:06

## 2017-12-17 RX ADMIN — Medication 10 MILLIGRAM(S): at 06:18

## 2017-12-17 RX ADMIN — SPIRONOLACTONE 25 MILLIGRAM(S): 25 TABLET, FILM COATED ORAL at 06:18

## 2017-12-17 RX ADMIN — BISOPROLOL FUMARATE 10 MILLIGRAM(S): 10 TABLET, FILM COATED ORAL at 06:18

## 2017-12-17 NOTE — PROGRESS NOTE ADULT - SUBJECTIVE AND OBJECTIVE BOX
Infectious Diseases progress note:    Subjective: Had leg pain earlier, now relieved with pain meds.  No fever or chills.  Leg overall feels better.    ROS:  CONSTITUTIONAL:  No fever, chills, rigors  CARDIOVASCULAR:  No chest pain or palpitations  RESPIRATORY:   No SOB, cough, dyspnea on exertion.  No wheezing  GASTROINTESTINAL:  No abd pain, N/V, diarrhea/constipation  EXTREMITIES:  No swelling or joint pain  GENITOURINARY:  No burning on urination, increased frequency or urgency.  No flank pain  NEUROLOGIC:  No HA, visual disturbances  SKIN: No rashes    Allergies    No Known Allergies    Intolerances        ANTIBIOTICS/RELEVANT:  antimicrobials  vancomycin  IVPB 1000 milliGRAM(s) IV Intermittent every 24 hours    immunologic:    OTHER:  acetaminophen   Tablet. 650 milliGRAM(s) Oral every 6 hours PRN  bisoprolol   Tablet 10 milliGRAM(s) Oral daily  docusate sodium 100 milliGRAM(s) Oral three times a day  lactobacillus acidophilus 1 Tablet(s) Oral daily  polyethylene glycol 3350 17 Gram(s) Oral daily  rivaroxaban 20 milliGRAM(s) Oral every 24 hours  senna 2 Tablet(s) Oral at bedtime  spironolactone 25 milliGRAM(s) Oral daily  torsemide 10 milliGRAM(s) Oral daily      Objective:  Vital Signs Last 24 Hrs  T(C): 36.9 (17 Dec 2017 19:35), Max: 36.9 (17 Dec 2017 19:35)  T(F): 98.5 (17 Dec 2017 19:35), Max: 98.5 (17 Dec 2017 19:35)  HR: 69 (17 Dec 2017 19:35) (58 - 83)  BP: 133/53 (17 Dec 2017 19:35) (125/78 - 152/80)  BP(mean): --  RR: 18 (17 Dec 2017 19:35) (18 - 18)  SpO2: 99% (17 Dec 2017 19:35) (98% - 100%)    PHYSICAL EXAM:  Constitutional:NAD  Eyes:PABLO, EOMI  Ear/Nose/Throat: no thrush, mucositis.  Moist mucous membranes	  Neck:no JVD, no lymphadenopathy, supple  Respiratory: CTA mikal  Cardiovascular: S1S2 RRR, no murmurs  Gastrointestinal:soft, nontender,  nondistended (+) BS  Extremities: RLE with wound packing in place.  Dressing c/d/i          LABS:                        12.3   7.8   )-----------( 183      ( 17 Dec 2017 09:47 )             39.4     12-16    136  |  102  |  13  ----------------------------<  95  4.7   |  23  |  1.03    Ca    9.8      16 Dec 2017 15:23            MICROBIOLOGY:    Culture - Blood (12.15.17 @ 22:11)    Specimen Source: .Blood Blood    Culture Results:   No growth to date.    Culture - Blood (12.15.17 @ 22:11)    Specimen Source: .Blood Blood    Culture Results:   No growth to date.          RADIOLOGY & ADDITIONAL STUDIES:

## 2017-12-17 NOTE — PROGRESS NOTE ADULT - SUBJECTIVE AND OBJECTIVE BOX
INTERVAL HPI/OVERNIGHT EVENTS: seen and examined . feel better .   Vital Signs Last 24 Hrs  T(C): 36.6 (17 Dec 2017 11:05), Max: 36.7 (16 Dec 2017 20:13)  T(F): 97.9 (17 Dec 2017 11:05), Max: 98.1 (16 Dec 2017 20:13)  HR: 58 (17 Dec 2017 11:05) (58 - 83)  BP: 125/78 (17 Dec 2017 11:05) (125/78 - 152/80)  BP(mean): --  RR: 18 (17 Dec 2017 11:05) (18 - 18)  SpO2: 100% (17 Dec 2017 11:05) (98% - 100%)  I&O's Summary    16 Dec 2017 07:01  -  17 Dec 2017 07:00  --------------------------------------------------------  IN: 720 mL / OUT: 0 mL / NET: 720 mL    17 Dec 2017 07:01  -  17 Dec 2017 15:19  --------------------------------------------------------  IN: 640 mL / OUT: 0 mL / NET: 640 mL      MEDICATIONS  (STANDING):  bisoprolol   Tablet 10 milliGRAM(s) Oral daily  docusate sodium 100 milliGRAM(s) Oral three times a day  lactobacillus acidophilus 1 Tablet(s) Oral daily  polyethylene glycol 3350 17 Gram(s) Oral daily  rivaroxaban 20 milliGRAM(s) Oral every 24 hours  senna 2 Tablet(s) Oral at bedtime  spironolactone 25 milliGRAM(s) Oral daily  torsemide 10 milliGRAM(s) Oral daily  vancomycin  IVPB 1000 milliGRAM(s) IV Intermittent every 24 hours    MEDICATIONS  (PRN):  acetaminophen   Tablet. 650 milliGRAM(s) Oral every 6 hours PRN Moderate Pain (4 - 6)    LABS:                        12.3   7.8   )-----------( 183      ( 17 Dec 2017 09:47 )             39.4     12-16    136  |  102  |  13  ----------------------------<  95  4.7   |  23  |  1.03    Ca    9.8      16 Dec 2017 15:23    TPro  8.0  /  Alb  3.9  /  TBili  0.6  /  DBili  x   /  AST  14  /  ALT  6<L>  /  AlkPhos  98  12-15        CAPILLARY BLOOD GLUCOSE      POCT Blood Glucose.: 153 mg/dL (16 Dec 2017 21:52)          REVIEW OF SYSTEMS:  CONSTITUTIONAL: No fever, weight loss, or fatigue  EYES: No eye pain, visual disturbances, or discharge  ENMT:  No difficulty hearing, tinnitus, vertigo; No sinus or throat pain  NECK: No pain or stiffness  BREASTS: No pain, masses, or nipple discharge  RESPIRATORY: No cough, wheezing, chills or hemoptysis; No shortness of breath  CARDIOVASCULAR: No chest pain, palpitations, dizziness, or leg swelling  GASTROINTESTINAL: No abdominal or epigastric pain. No nausea, vomiting, or hematemesis; No diarrhea or constipation. No melena or hematochezia.  GENITOURINARY: No dysuria, frequency, hematuria, or incontinence  NEUROLOGICAL: No headaches, memory loss, loss of strength, numbness, or tremors  SKIN: No itching, burning, rashes, or lesions   LYMPH NODES: No enlarged glands  ENDOCRINE: No heat or cold intolerance; No hair loss  MUSCULOSKELETAL: No joint pain or swelling; No muscle, back, or extremity pain  PSYCHIATRIC: No depression, anxiety, mood swings, or difficulty sleeping  HEME/LYMPH: No easy bruising, or bleeding gums  ALLERY AND IMMUNOLOGIC: No hives or eczema    RADIOLOGY & ADDITIONAL TESTS:    Consultant(s) Notes Reviewed:  [x ] YES  [ ] NO    PHYSICAL EXAM:  GENERAL: NAD, well-groomed, well-developed,not in any distress ,  HEAD:  Atraumatic, Normocephalic  EYES: EOMI, PERRLA, conjunctiva and sclera clear  ENMT: No tonsillar erythema, exudates, or enlargement; Moist mucous membranes, Good dentition, No lesions  NECK: Supple, No JVD, Normal thyroid  NERVOUS SYSTEM:  Alert & Oriented X3, No focal deficit   CHEST/LUNG: Good air entry bilateral with no  rales, rhonchi, wheezing, or rubs  HEART: Regular rate and rhythm; No murmurs, rubs, or gallops  ABDOMEN: Soft, Nontender, Nondistended; Bowel sounds present  EXTREMITIES:  Left leg bandaged . swelling and redness decreased.     Care Discussed with Consultants/Other Providers [ x] YES  [ ] NO

## 2017-12-17 NOTE — PROGRESS NOTE ADULT - ASSESSMENT
83yo female history of afib on xarelto, hypertension p/w left lower extremity erythema since tuesday. Pt. reports bruising left leg with a box. Pt. on levofloxacin since Wednesday by Dr. Friedman, seen by PMD (Dr. Wesley friedman today and referred to ED)  Pt. reports pain to the site with yellow drainage. Denies fevers, chest pain, sob, abdominal pain. Pt. reports pain with walking. Pt. taking tylenol with mild relief. (15 Dec 2017 22:25)    In the ER vitals were: T 99.5, P 76, BP  156/94.  Pt had normal WBC and lactate on admission.  No evidence of DVT on dopplers or radiographic signs of OM on xray.   Pt with large pus filled blister on anterior shin.  States has been having leakage from blister area.          Recommend:    - s/p I & D of abscess.  Appreciate Surgery f/u.  F/u wound cultures.     - Cont vancomycin to cover MRSA.  F/u ID and sensitivities.    - Check blood cultures - NGTD    Mihcaela Estrada  [phone]

## 2017-12-17 NOTE — PROGRESS NOTE ADULT - ASSESSMENT
81yo female history of afib on xarelto, hypertension p/w left lower extremity erythema since tuesday. Pt. reports bruising left leg with a box. Pt. on levofloxacin since Wednesday by Dr. Reza, seen by PMD(Dr. Wesley friedman today and referred to ED. Pt. reports pain to the site with yellow drainage. Denies fevers, chest pain, sob, abdominal pain. Pt. reports pain with walking. Pt. taking tylenol with mild relief.     Problem/Plan - 1:  ·  Problem: Cellulitis of lower extremity, unspecified laterality, with Hematoma S/P I&D .  Plan: IV abxs vancomycin .  ID consulted to help with Abxs . Wound care consulted. Doppler and xrays noted.      Problem/Plan - 2:  ·  Problem: Hypertension.  Plan: BP meds.      Problem/Plan - 3:  ·  Problem: Atrial fibrillation with controlled ventricular response.  Plan: On Xarelto.

## 2017-12-18 ENCOUNTER — RESULT REVIEW (OUTPATIENT)
Age: 82
End: 2017-12-18

## 2017-12-18 LAB — VANCOMYCIN TROUGH SERPL-MCNC: 8.5 UG/ML — LOW (ref 10–20)

## 2017-12-18 PROCEDURE — 11042 DBRDMT SUBQ TIS 1ST 20SQCM/<: CPT

## 2017-12-18 PROCEDURE — 88304 TISSUE EXAM BY PATHOLOGIST: CPT | Mod: 26

## 2017-12-18 RX ORDER — VANCOMYCIN HCL 1 G
1000 VIAL (EA) INTRAVENOUS EVERY 12 HOURS
Qty: 0 | Refills: 0 | Status: DISCONTINUED | OUTPATIENT
Start: 2017-12-18 | End: 2017-12-19

## 2017-12-18 RX ADMIN — SPIRONOLACTONE 25 MILLIGRAM(S): 25 TABLET, FILM COATED ORAL at 05:17

## 2017-12-18 RX ADMIN — Medication 1 TABLET(S): at 11:25

## 2017-12-18 RX ADMIN — BISOPROLOL FUMARATE 10 MILLIGRAM(S): 10 TABLET, FILM COATED ORAL at 05:17

## 2017-12-18 RX ADMIN — Medication 10 MILLIGRAM(S): at 05:17

## 2017-12-18 RX ADMIN — RIVAROXABAN 20 MILLIGRAM(S): KIT at 18:25

## 2017-12-18 NOTE — CONSULT NOTE ADULT - ASSESSMENT
A/P:BLE PVD w/ traumatic wound LLE - ADAPTIC + ACE WRAP  BLE elevation  con't Nutrition (as tolerated)  con't Offloading   con't Pericare  Care as per medicine will follow w/ you  Upon discharge f/u as outpatient at [facility] [address] [facility] [phone]  Seen w/ attng and D/w team  Thank you for this consult  Gwendolyn Vences PA-C [facility] [id number]

## 2017-12-18 NOTE — PROGRESS NOTE ADULT - SUBJECTIVE AND OBJECTIVE BOX
INTERVAL HPI/OVERNIGHT EVENTS:  Vital Signs Last 24 Hrs  T(C): 36.8 (18 Dec 2017 05:10), Max: 36.9 (17 Dec 2017 19:35)  T(F): 98.3 (18 Dec 2017 05:10), Max: 98.5 (17 Dec 2017 19:35)  HR: 67 (18 Dec 2017 05:10) (67 - 69)  BP: 157/81 (18 Dec 2017 05:10) (133/53 - 157/81)  BP(mean): --  RR: 18 (18 Dec 2017 05:10) (18 - 18)  SpO2: 100% (18 Dec 2017 05:10) (99% - 100%)  I&O's Summary    17 Dec 2017 07:01  -  18 Dec 2017 07:00  --------------------------------------------------------  IN: 1410 mL / OUT: 0 mL / NET: 1410 mL      MEDICATIONS  (STANDING):  bisoprolol   Tablet 10 milliGRAM(s) Oral daily  docusate sodium 100 milliGRAM(s) Oral three times a day  lactobacillus acidophilus 1 Tablet(s) Oral daily  polyethylene glycol 3350 17 Gram(s) Oral daily  rivaroxaban 20 milliGRAM(s) Oral every 24 hours  senna 2 Tablet(s) Oral at bedtime  spironolactone 25 milliGRAM(s) Oral daily  torsemide 10 milliGRAM(s) Oral daily  vancomycin  IVPB 1000 milliGRAM(s) IV Intermittent every 24 hours    MEDICATIONS  (PRN):  acetaminophen   Tablet. 650 milliGRAM(s) Oral every 6 hours PRN Moderate Pain (4 - 6)    LABS:                        12.3   7.8   )-----------( 183      ( 17 Dec 2017 09:47 )             39.4     12-16    136  |  102  |  13  ----------------------------<  95  4.7   |  23  |  1.03    Ca    9.8      16 Dec 2017 15:23          CAPILLARY BLOOD GLUCOSE              REVIEW OF SYSTEMS:  CONSTITUTIONAL: No fever, weight loss, or fatigue  EYES: No eye pain, visual disturbances, or discharge  ENMT:  No difficulty hearing, tinnitus, vertigo; No sinus or throat pain  NECK: No pain or stiffness  BREASTS: No pain, masses, or nipple discharge  RESPIRATORY: No cough, wheezing, chills or hemoptysis; No shortness of breath  CARDIOVASCULAR: No chest pain, palpitations, dizziness, or leg swelling  GASTROINTESTINAL: No abdominal or epigastric pain. No nausea, vomiting, or hematemesis; No diarrhea or constipation. No melena or hematochezia.  GENITOURINARY: No dysuria, frequency, hematuria, or incontinence  NEUROLOGICAL: No headaches, memory loss, loss of strength, numbness, or tremors  SKIN: No itching, burning, rashes, or lesions   LYMPH NODES: No enlarged glands  ENDOCRINE: No heat or cold intolerance; No hair loss  MUSCULOSKELETAL: No joint pain or swelling; No muscle, back, or extremity pain  PSYCHIATRIC: No depression, anxiety, mood swings, or difficulty sleeping  HEME/LYMPH: No easy bruising, or bleeding gums  ALLERY AND IMMUNOLOGIC: No hives or eczema    RADIOLOGY & ADDITIONAL TESTS:    Consultant(s) Notes Reviewed:  [x ] YES  [ ] NO    PHYSICAL EXAM:  GENERAL: NAD, well-groomed, well-developed,not in any distress ,  HEAD:  Atraumatic, Normocephalic  EYES: EOMI, PERRLA, conjunctiva and sclera clear  ENMT: No tonsillar erythema, exudates, or enlargement; Moist mucous membranes, Good dentition, No lesions  NECK: Supple, No JVD, Normal thyroid  NERVOUS SYSTEM:  Alert & Oriented X3, No focal deficit   CHEST/LUNG: Good air entry bilateral with no  rales, rhonchi, wheezing, or rubs  HEART: Regular rate and rhythm; No murmurs, rubs, or gallops  ABDOMEN: Soft, Nontender, Nondistended; Bowel sounds present  EXTREMITIES:  2+ Peripheral Pulses, No clubbing, cyanosis, or edema  SKIN: No rashes or lesions    Care Discussed with Consultants/Other Providers [ x] YES  [ ] NO INTERVAL HPI/OVERNIGHT EVENTS: Feel better .   Vital Signs Last 24 Hrs  T(C): 36.8 (18 Dec 2017 05:10), Max: 36.9 (17 Dec 2017 19:35)  T(F): 98.3 (18 Dec 2017 05:10), Max: 98.5 (17 Dec 2017 19:35)  HR: 67 (18 Dec 2017 05:10) (67 - 69)  BP: 157/81 (18 Dec 2017 05:10) (133/53 - 157/81)  BP(mean): --  RR: 18 (18 Dec 2017 05:10) (18 - 18)  SpO2: 100% (18 Dec 2017 05:10) (99% - 100%)  I&O's Summary    17 Dec 2017 07:01  -  18 Dec 2017 07:00  --------------------------------------------------------  IN: 1410 mL / OUT: 0 mL / NET: 1410 mL      MEDICATIONS  (STANDING):  bisoprolol   Tablet 10 milliGRAM(s) Oral daily  docusate sodium 100 milliGRAM(s) Oral three times a day  lactobacillus acidophilus 1 Tablet(s) Oral daily  polyethylene glycol 3350 17 Gram(s) Oral daily  rivaroxaban 20 milliGRAM(s) Oral every 24 hours  senna 2 Tablet(s) Oral at bedtime  spironolactone 25 milliGRAM(s) Oral daily  torsemide 10 milliGRAM(s) Oral daily  vancomycin  IVPB 1000 milliGRAM(s) IV Intermittent every 24 hours    MEDICATIONS  (PRN):  acetaminophen   Tablet. 650 milliGRAM(s) Oral every 6 hours PRN Moderate Pain (4 - 6)    LABS:                        12.3   7.8   )-----------( 183      ( 17 Dec 2017 09:47 )             39.4     12-16    136  |  102  |  13  ----------------------------<  95  4.7   |  23  |  1.03    Ca    9.8      16 Dec 2017 15:23          CAPILLARY BLOOD GLUCOSE              REVIEW OF SYSTEMS:  CONSTITUTIONAL: No fever, weight loss, or fatigue  EYES: No eye pain, visual disturbances, or discharge  ENMT:  No difficulty hearing, tinnitus, vertigo; No sinus or throat pain  NECK: No pain or stiffness  BREASTS: No pain, masses, or nipple discharge  RESPIRATORY: No cough, wheezing, chills or hemoptysis; No shortness of breath  CARDIOVASCULAR: No chest pain, palpitations, dizziness, or leg swelling  GASTROINTESTINAL: No abdominal or epigastric pain. No nausea, vomiting, or hematemesis; No diarrhea or constipation. No melena or hematochezia.  GENITOURINARY: No dysuria, frequency, hematuria, or incontinence  NEUROLOGICAL: No headaches, memory loss, loss of strength, numbness, or tremors  SKIN: No itching, burning, rashes, or lesions   LYMPH NODES: No enlarged glands  ENDOCRINE: No heat or cold intolerance; No hair loss  MUSCULOSKELETAL: No joint pain or swelling; No muscle, back, or extremity pain  PSYCHIATRIC: No depression, anxiety, mood swings, or difficulty sleeping  HEME/LYMPH: No easy bruising, or bleeding gums  ALLERY AND IMMUNOLOGIC: No hives or eczema    RADIOLOGY & ADDITIONAL TESTS:    Consultant(s) Notes Reviewed:  [x ] YES  [ ] NO    PHYSICAL EXAM:  GENERAL: NAD, well-groomed, well-developed,not in any distress ,  HEAD:  Atraumatic, Normocephalic  EYES: EOMI, PERRLA, conjunctiva and sclera clear  ENMT: No tonsillar erythema, exudates, or enlargement; Moist mucous membranes, Good dentition, No lesions  NECK: Supple, No JVD, Normal thyroid  NERVOUS SYSTEM:  Alert & Oriented X3, No focal deficit   CHEST/LUNG: Good air entry bilateral with no  rales, rhonchi, wheezing, or rubs  HEART: Regular rate and rhythm; No murmurs, rubs, or gallops  ABDOMEN: Soft, Nontender, Nondistended; Bowel sounds present  EXTREMITIES:  2+ Peripheral Pulses, No clubbing, cyanosis, or edema  Leg dressed     Care Discussed with Consultants/Other Providers [ x] YES  [ ] NO

## 2017-12-18 NOTE — CONSULT NOTE ADULT - SUBJECTIVE AND OBJECTIVE BOX
Wound SURGERY CONSULT NOTE    HPI:  81yo female history of afib on xarelto, hypertension p/w left lower extremity erythema since tuesday. Pt. reports bruising left leg with a box. Pt. on levofloxacin since Wednesday by Dr. Reza, seen by PMD(Dr. Wesley friedman today and referred to ED. Pt. reports pain to the site with yellow drainage. Denies fevers, chest pain, sob, abdominal pain. Pt. reports pain with walking. Pt. taking tylenol with mild relief. .  Pt s/p I&D in ER.  DSD on Wound- no odor, some drainage.  LLE swelling worse than RLE.  h/o BLE swelling baseline.  willing to try compression stockings,  redness improving. pain improving.  no increased warmth.      PAST MEDICAL & SURGICAL HISTORY:  Hypertension  Aortic aneurysm: Ascending aortic aneurysm Repair done in 2011 [place]/ uncomplicated post-op course, no Ct scan follow up  Atrial fibrillation: unsuccessful Cardioversion 2 yrs ago  Benign essential HTN: 40 yrs      REVIEW OF SYSTEMS    Skin/Musculoskeletal:	see HPI  all other systems negative    MEDICATIONS  (STANDING):  bisoprolol   Tablet 10 milliGRAM(s) Oral daily  docusate sodium 100 milliGRAM(s) Oral three times a day  lactobacillus acidophilus 1 Tablet(s) Oral daily  polyethylene glycol 3350 17 Gram(s) Oral daily  rivaroxaban 20 milliGRAM(s) Oral every 24 hours  senna 2 Tablet(s) Oral at bedtime  spironolactone 25 milliGRAM(s) Oral daily  torsemide 10 milliGRAM(s) Oral daily  vancomycin  IVPB 1000 milliGRAM(s) IV Intermittent every 24 hours    MEDICATIONS  (PRN):  acetaminophen   Tablet. 650 milliGRAM(s) Oral every 6 hours PRN Moderate Pain (4 - 6)    No Known Allergies    SOCIAL HISTORY:  ; former smoker, Denies ETOH, drugs    FAMILY HISTORY: no significant      Vital Signs Last 24 Hrs  T(C): 36.6 (18 Dec 2017 13:22), Max: 36.9 (17 Dec 2017 19:35)  T(F): 97.9 (18 Dec 2017 13:22), Max: 98.5 (17 Dec 2017 19:35)  HR: 81 (18 Dec 2017 13:22) (67 - 81)  BP: 118/70 (18 Dec 2017 13:22) (118/70 - 157/81)  BP(mean): --  RR: 18 (18 Dec 2017 13:22) (18 - 18)  SpO2: 97% (18 Dec 2017 13:22) (97% - 100%)    NAD / A&Ox3  Obese  WD/ WN/ WG  Versa Care P500 bed    Cardiovascular: RRR     Respiratory: CTA    Gastrointestinal soft NT/ND (+)BS    Neurology strength & sensation grossly intact    Musculoskeletal/Vascular:  FROM x4  L>RLE edema  No palpable DP/PT, equally warm, no acute ischemic changes  mild hemosiderin staining  no deformities/ contractures  Procedure Note  Using aseptic technique LLE wound was excisionally debrided using a scissor and forceps through necrotic/ ruptured bullae dermis revealing ulcerative & friable granular wound.  Pt tolerated procedure well.  Hemostasis was maintained throughout.    9cm x 11.5cm x 0 pre and 9cm x 11.5cm x 0.1cm post  (+) scant serosanguinous drainage, minimally tender  No odor, erythema, increased warmth, induration, fluctuance    Skin:  moist w/ good turgor      LABS:                        12.3   7.8   )-----------( 183      ( 17 Dec 2017 09:47 )             39.4     Albumin, Serum: 3.9 g/dL (12-15 @ 17:09)      HgA1c      RADIOLOGY & ADDITIONAL STUDIES:  Cultures:    Culture - Abscess with Gram Stain (12.17.17 @ 08:35)    Specimen Source: .Abscess Leg - Left    Culture Results:   No growth    < from: VA Duplex Lower Ext Vein Scan, Left (12.15.17 @ 19:19) >  INDINGS:    There is normal compressibility of the left common femoral, femoral and   popliteal veins. No calf vein thrombosis is detected.    Thecontralateral common femoral vein is patent.    Doppler examination shows normal spontaneous and phasic flow.    Edema in the left popliteal fossa and calf.    IMPRESSION:     No evidence of left lower extremity deep venous thrombosis.    < from: Xray Tibia + Fibula 2 Views, Left (12.15.17 @ 17:01) >  FINDINGS:     No cortical erosion, periosteal reaction, to suggest ostomy myelitis.  No acute fracture. No dislocation. Severe tricompartmental arthrosis of   the knee greatest in the medial compartment with a mild lateral   subluxation of the tibia relative to the femur and with a minimal genu   varus deformity.  Significant overlying diffuse soft tissue swelling.   Large plantar calcaneal spur.    IMPRESSION:     No radiographic evidence of osteomyelitis.If clinical concern persists,   cross-sectional imaging may be obtained for further evaluation.

## 2017-12-18 NOTE — PROGRESS NOTE ADULT - ASSESSMENT
83yo female history of afib on xarelto, hypertension p/w left lower extremity erythema since tuesday. Pt. reports bruising left leg with a box. Pt. on levofloxacin since Wednesday by Dr. Friedman, seen by PMD (Dr. Wesley friedman today and referred to ED)  Pt. reports pain to the site with yellow drainage. Denies fevers, chest pain, sob, abdominal pain. Pt. reports pain with walking. Pt. taking tylenol with mild relief. (15 Dec 2017 22:25)    In the ER vitals were: T 99.5, P 76, BP  156/94.  Pt had normal WBC and lactate on admission.  No evidence of DVT on dopplers or radiographic signs of OM on xray.   Pt with large pus filled blister on anterior shin.  States has been having leakage from blister area.          Recommend:    - s/p I & D of abscess.  Appreciate Surgery f/u.  F/u wound cultures - NGTD    - Cont vancomycin to cover for possible MRSA.      - Check blood cultures - NGTD    - Cont IV abx for now    Michaela Estrada  [phone] 81yo female history of afib on xarelto, hypertension p/w left lower extremity erythema since tuesday. Pt. reports bruising left leg with a box. Pt. on levofloxacin since Wednesday by Dr. Friedman, seen by PMD (Dr. Wesley friedman today and referred to ED)  Pt. reports pain to the site with yellow drainage. Denies fevers, chest pain, sob, abdominal pain. Pt. reports pain with walking. Pt. taking tylenol with mild relief. (15 Dec 2017 22:25)    In the ER vitals were: T 99.5, P 76, BP  156/94.  Pt had normal WBC and lactate on admission.  No evidence of DVT on dopplers or radiographic signs of OM on xray.   Pt with large pus filled blister on anterior shin.  States has been having leakage from blister area.          Recommend:    - s/p I & D of abscess.  Appreciate Surgery f/u.  F/u wound cultures - NGTD    - Cont vancomycin to cover for possible MRSA.      - Check blood cultures - NGTD    - Cont IV abx for now.  If patient continues to improve, will change to PO in next 24 hrs (doxy, augmentin x 7 days)    Michaela Estrada  [phone]

## 2017-12-18 NOTE — PROGRESS NOTE ADULT - SUBJECTIVE AND OBJECTIVE BOX
Infectious Diseases progress note:    Subjective:  Pt feels well.  Leg less swollen.  Able to ambulate.  No fevers.  Seen by wound care and had bedside debridement.    ROS:  CONSTITUTIONAL:  No fever, chills, rigors  CARDIOVASCULAR:  No chest pain or palpitations  RESPIRATORY:   No SOB, cough, dyspnea on exertion.  No wheezing  GASTROINTESTINAL:  No abd pain, N/V, diarrhea/constipation  EXTREMITIES:  No swelling or joint pain  GENITOURINARY:  No burning on urination, increased frequency or urgency.  No flank pain  NEUROLOGIC:  No HA, visual disturbances  SKIN: No rashes    Allergies    No Known Allergies    Intolerances        ANTIBIOTICS/RELEVANT:  antimicrobials  vancomycin  IVPB 1000 milliGRAM(s) IV Intermittent every 24 hours    immunologic:    OTHER:  acetaminophen   Tablet. 650 milliGRAM(s) Oral every 6 hours PRN  bisoprolol   Tablet 10 milliGRAM(s) Oral daily  docusate sodium 100 milliGRAM(s) Oral three times a day  lactobacillus acidophilus 1 Tablet(s) Oral daily  polyethylene glycol 3350 17 Gram(s) Oral daily  rivaroxaban 20 milliGRAM(s) Oral every 24 hours  senna 2 Tablet(s) Oral at bedtime  spironolactone 25 milliGRAM(s) Oral daily  torsemide 10 milliGRAM(s) Oral daily      Objective:  Vital Signs Last 24 Hrs  T(C): 36.6 (18 Dec 2017 13:22), Max: 36.9 (17 Dec 2017 19:35)  T(F): 97.9 (18 Dec 2017 13:22), Max: 98.5 (17 Dec 2017 19:35)  HR: 81 (18 Dec 2017 13:22) (67 - 81)  BP: 118/70 (18 Dec 2017 13:22) (118/70 - 157/81)  BP(mean): --  RR: 18 (18 Dec 2017 13:22) (18 - 18)  SpO2: 97% (18 Dec 2017 13:22) (97% - 100%)    PHYSICAL EXAM:  Constitutional:NAD  Eyes:PABLO, EOMI  Ear/Nose/Throat: no thrush, mucositis.  Moist mucous membranes	  Neck:no JVD, no lymphadenopathy, supple  Respiratory: CTA mikal  Cardiovascular: S1S2 RRR, no murmurs  Gastrointestinal:soft, nontender,  nondistended (+) BS  Extremities:no e/e/c  Skin:  RLE dressing in place.  Still with erythema on upper anterior shin.        LABS:                        12.3   7.8   )-----------( 183      ( 17 Dec 2017 09:47 )             39.4                 MICROBIOLOGY:    Culture - Abscess with Gram Stain (12.17.17 @ 08:35)    Specimen Source: .Abscess Leg - Left    Culture Results:   No growth    Culture - Blood (12.15.17 @ 22:11)    Specimen Source: .Blood Blood    Culture Results:   No growth to date.    Culture - Blood (12.15.17 @ 22:11)    Specimen Source: .Blood Blood    Culture Results:   No growth to date.          RADIOLOGY & ADDITIONAL STUDIES:    < from: VA Duplex Lower Ext Vein Scan, Left (12.15.17 @ 19:19) >  FINDINGS:    There is normal compressibility of the left common femoral, femoral and   popliteal veins. No calf vein thrombosis is detected.    Thecontralateral common femoral vein is patent.    Doppler examination shows normal spontaneous and phasic flow.    Edema in the left popliteal fossa and calf.    IMPRESSION:     No evidence of left lower extremity deep venous thrombosis.    < end of copied text >

## 2017-12-18 NOTE — PROGRESS NOTE ADULT - ASSESSMENT
83yo female history of afib on xarelto, hypertension p/w left lower extremity erythema since tuesday. Pt. reports bruising left leg with a box. Pt. on levofloxacin since Wednesday by Dr. Reza, seen by PMD(Dr. Wesley friedman today and referred to ED. Pt. reports pain to the site with yellow drainage. Denies fevers, chest pain, sob, abdominal pain. Pt. reports pain with walking. Pt. taking tylenol with mild relief.     Problem/Plan - 1:  ·  Problem: Cellulitis of lower extremity, unspecified laterality, with Hematoma S/P  I&D .  Plan: IV abxs vancomycin .  ID consulted to help with Abxs . Wound care consult noted. Doppler and xrays noted. Possible DC Home tomorrow on PO Abxs.      Problem/Plan - 2:  ·  Problem: Hypertension.  Plan: BP meds.      Problem/Plan - 3:  ·  Problem: Atrial fibrillation with controlled ventricular response.  Plan: On Xarelto.

## 2017-12-18 NOTE — CONSULT NOTE ADULT - ATTENDING COMMENTS
83 yo aa female, h/o chronic swelling left lower leg, denies DVT, c/o wound left lower leg following fall.  Left lower leg Wound is traumatic, superimposed on chronic swellin. Completed venous duplex study.  Wound debrided, orders given. Wound Hematoma related to use of Xarelto, and trauma.  Status discussed.

## 2017-12-19 ENCOUNTER — TRANSCRIPTION ENCOUNTER (OUTPATIENT)
Age: 82
End: 2017-12-19

## 2017-12-19 VITALS
OXYGEN SATURATION: 97 % | SYSTOLIC BLOOD PRESSURE: 126 MMHG | RESPIRATION RATE: 18 BRPM | TEMPERATURE: 98 F | DIASTOLIC BLOOD PRESSURE: 77 MMHG | HEART RATE: 84 BPM

## 2017-12-19 PROCEDURE — 88304 TISSUE EXAM BY PATHOLOGIST: CPT

## 2017-12-19 PROCEDURE — 82803 BLOOD GASES ANY COMBINATION: CPT

## 2017-12-19 PROCEDURE — 85027 COMPLETE CBC AUTOMATED: CPT

## 2017-12-19 PROCEDURE — 80048 BASIC METABOLIC PNL TOTAL CA: CPT

## 2017-12-19 PROCEDURE — 87070 CULTURE OTHR SPECIMN AEROBIC: CPT

## 2017-12-19 PROCEDURE — 80053 COMPREHEN METABOLIC PANEL: CPT

## 2017-12-19 PROCEDURE — 80202 ASSAY OF VANCOMYCIN: CPT

## 2017-12-19 PROCEDURE — 87040 BLOOD CULTURE FOR BACTERIA: CPT

## 2017-12-19 PROCEDURE — 82947 ASSAY GLUCOSE BLOOD QUANT: CPT

## 2017-12-19 PROCEDURE — 99285 EMERGENCY DEPT VISIT HI MDM: CPT | Mod: 25

## 2017-12-19 PROCEDURE — 85014 HEMATOCRIT: CPT

## 2017-12-19 PROCEDURE — 82330 ASSAY OF CALCIUM: CPT

## 2017-12-19 PROCEDURE — 82962 GLUCOSE BLOOD TEST: CPT

## 2017-12-19 PROCEDURE — 87205 SMEAR GRAM STAIN: CPT

## 2017-12-19 PROCEDURE — 73590 X-RAY EXAM OF LOWER LEG: CPT

## 2017-12-19 PROCEDURE — 93971 EXTREMITY STUDY: CPT

## 2017-12-19 PROCEDURE — 83605 ASSAY OF LACTIC ACID: CPT

## 2017-12-19 PROCEDURE — 82435 ASSAY OF BLOOD CHLORIDE: CPT

## 2017-12-19 PROCEDURE — 84132 ASSAY OF SERUM POTASSIUM: CPT

## 2017-12-19 PROCEDURE — 84295 ASSAY OF SERUM SODIUM: CPT

## 2017-12-19 PROCEDURE — 96374 THER/PROPH/DIAG INJ IV PUSH: CPT

## 2017-12-19 RX ORDER — LACTOBACILLUS ACIDOPHILUS 100MM CELL
1 CAPSULE ORAL
Qty: 15 | Refills: 0 | OUTPATIENT
Start: 2017-12-19 | End: 2018-01-02

## 2017-12-19 RX ORDER — SENNA PLUS 8.6 MG/1
2 TABLET ORAL
Qty: 60 | Refills: 0 | OUTPATIENT
Start: 2017-12-19 | End: 2018-01-17

## 2017-12-19 RX ORDER — DOCUSATE SODIUM 100 MG
1 CAPSULE ORAL
Qty: 90 | Refills: 0 | OUTPATIENT
Start: 2017-12-19 | End: 2018-01-17

## 2017-12-19 RX ORDER — POLYETHYLENE GLYCOL 3350 17 G/17G
17 POWDER, FOR SOLUTION ORAL
Qty: 300 | Refills: 0 | OUTPATIENT
Start: 2017-12-19 | End: 2018-01-02

## 2017-12-19 RX ORDER — VANCOMYCIN HCL 1 G
500 VIAL (EA) INTRAVENOUS EVERY 12 HOURS
Qty: 0 | Refills: 0 | Status: DISCONTINUED | OUTPATIENT
Start: 2017-12-19 | End: 2017-12-19

## 2017-12-19 RX ORDER — VANCOMYCIN HCL 1 G
1000 VIAL (EA) INTRAVENOUS EVERY 12 HOURS
Qty: 0 | Refills: 0 | Status: DISCONTINUED | OUTPATIENT
Start: 2017-12-19 | End: 2017-12-19

## 2017-12-19 RX ADMIN — RIVAROXABAN 20 MILLIGRAM(S): KIT at 17:06

## 2017-12-19 RX ADMIN — Medication 250 MILLIGRAM(S): at 17:06

## 2017-12-19 RX ADMIN — Medication 10 MILLIGRAM(S): at 05:17

## 2017-12-19 RX ADMIN — Medication 100 MILLIGRAM(S): at 00:42

## 2017-12-19 RX ADMIN — BISOPROLOL FUMARATE 10 MILLIGRAM(S): 10 TABLET, FILM COATED ORAL at 06:43

## 2017-12-19 RX ADMIN — Medication 1 TABLET(S): at 13:08

## 2017-12-19 RX ADMIN — SPIRONOLACTONE 25 MILLIGRAM(S): 25 TABLET, FILM COATED ORAL at 05:18

## 2017-12-19 NOTE — DISCHARGE NOTE ADULT - CARE PROVIDER_API CALL
Yehuda Hdz), Surgery  [address] [facility]  [address]  [place] [address]  Phone: [phone]  Fax: [phone]    Gaurav Olson  [address] [facility] [address]   [place], [place] [address]    Phone 1:? [phone]  Phone: [phone]  Fax: (   )    -    Fatimah Yen  [address] [place], [address] [address]             [address], [address] [address]   Phone : [phone]  Phone: [phone]  Fax: (   )    -

## 2017-12-19 NOTE — DISCHARGE NOTE ADULT - PLAN OF CARE
Resolving. Completed course of IV antibiotics. Take all of your antibiotics as prescribed.  Call your Health Care Provider within two days of arriving home to make a follow up appointment within one week.  If the affected cellulitic area increases in redness, warmth, pain or swelling call your Health Care Provider.  If you develop fever, chills, and/or malaise, call your Health Care Provider. Atrial fibrillation is the most common heart rhythm problem.  The condition puts you at risk for has stroke and heart attack.  It helps if you control your blood pressure, not drink more than 1-2 alcohol drinks per day, cut down on caffeine, getting treatment for over active thyroid gland, and get regular exercise  Call your doctor if you feel your heart racing or beating unusually, chest tightness or pain, lightheaded, faint, shortness of breath especially with exercise  It is important to take your heart medication as prescribed  You may be on anticoagulation which is very important to take as directed.   Call your Cardiologist for any bleeding or call 911 to go to the closest Emergency Room for severe bleeding. Take your medication as prescribed.  Follow up with your medical doctor for routine blood pressure monitoring, and to establish long term blood pressure treatment goals.  Low salt diet  Activity as tolerated.  Notify your doctor if you have any of the following symptoms:   Dizziness, Lightheadedness, Blurry vision, Headache, Chest pain, Shortness of breath

## 2017-12-19 NOTE — DISCHARGE NOTE ADULT - MEDICATION SUMMARY - MEDICATIONS TO STOP TAKING
I will STOP taking the medications listed below when I get home from the hospital:    levoFLOXacin 500 mg oral tablet  -- 1 tab(s) by mouth once a day for 10 days

## 2017-12-19 NOTE — DISCHARGE NOTE ADULT - HOSPITAL COURSE
-83yo female history of afib on xarelto, hypertension p/w left lower extremity erythema since tuesday. Pt. reports bruising left leg with a box. Pt. on levofloxacin since Wednesday by Dr. Friedman, seen by PMD (Dr. Wesley friedman today and referred to ED)  Pt. reports pain to the site with yellow drainage. s/p I & D of abscess.  Appreciate Surgery f/u.  F/u wound cultures - NGTD    - Cont vancomycin to cover for possible MRSA.      - Check blood cultures - NGTD    - Cont IV abx for now.  If patient continues to improve, will change to PO in next 24 hrs (doxy, augmentin x 7 days) 81yo female history of afib on xarelto, hypertension p/w left lower extremity erythema and  bruising left leg with a box. Pt. was on Levofloxacin since Wednesday by Dr. Nolacso, seen by PMD (Dr. Wesley Nolasco today and referred to ED)    Pt  s/p I & D of abscess.    Blood cultures - NGTD. Treated with  IV Vancomycin. Pt to  Cont on oral  antibiotics for 7 more days. Instructed to follow up with Podiatrist, her PCP and Cardiologist in 1 week after   her discharge from hospital.

## 2017-12-19 NOTE — PROGRESS NOTE ADULT - ASSESSMENT
83yo female history of afib on xarelto, hypertension p/w left lower extremity erythema since tuesday. Pt. reports bruising left leg with a box. Pt. on levofloxacin since Wednesday by Dr. Reza, seen by PMD(Dr. Wesley friedman today and referred to ED. Pt. reports pain to the site with yellow drainage. Denies fevers, chest pain, sob, abdominal pain. Pt. reports pain with walking. Pt. taking tylenol with mild relief.     Problem/Plan - 1:  ·  Problem: Cellulitis of lower extremity, unspecified laterality, with Hematoma S/P  I&D .  Plan: IV abxs vancomycin .  ID consulted to help with Abxs  and will change to PO . Wound care consult noted. Doppler and x rays noted. Possible DC Home today on PO Abxs.      Problem/Plan - 2:  ·  Problem: Hypertension.  Plan: BP meds.      Problem/Plan - 3:  ·  Problem: Atrial fibrillation with controlled ventricular response.  Plan: On Xarelto.

## 2017-12-19 NOTE — PROGRESS NOTE ADULT - SUBJECTIVE AND OBJECTIVE BOX
INTERVAL HPI/OVERNIGHT EVENTS:  Feel better.   Vital Signs Last 24 Hrs  T(C): 36.6 (19 Dec 2017 12:30), Max: 36.8 (19 Dec 2017 05:14)  T(F): 97.9 (19 Dec 2017 12:30), Max: 98.3 (19 Dec 2017 05:14)  HR: 69 (19 Dec 2017 12:30) (65 - 71)  BP: 115/72 (19 Dec 2017 12:30) (115/72 - 144/83)  BP(mean): --  RR: 18 (19 Dec 2017 12:30) (17 - 18)  SpO2: 99% (19 Dec 2017 12:30) (98% - 99%)  I&O's Summary    18 Dec 2017 07:01  -  19 Dec 2017 07:00  --------------------------------------------------------  IN: 1270 mL / OUT: 0 mL / NET: 1270 mL      MEDICATIONS  (STANDING):  bisoprolol   Tablet 10 milliGRAM(s) Oral daily  docusate sodium 100 milliGRAM(s) Oral three times a day  lactobacillus acidophilus 1 Tablet(s) Oral daily  polyethylene glycol 3350 17 Gram(s) Oral daily  rivaroxaban 20 milliGRAM(s) Oral every 24 hours  senna 2 Tablet(s) Oral at bedtime  spironolactone 25 milliGRAM(s) Oral daily  torsemide 10 milliGRAM(s) Oral daily  vancomycin  IVPB 1000 milliGRAM(s) IV Intermittent every 12 hours    MEDICATIONS  (PRN):  acetaminophen   Tablet. 650 milliGRAM(s) Oral every 6 hours PRN Moderate Pain (4 - 6)    LABS:              CAPILLARY BLOOD GLUCOSE              REVIEW OF SYSTEMS:  CONSTITUTIONAL: No fever, weight loss, or fatigue  EYES: No eye pain, visual disturbances, or discharge  ENMT:  No difficulty hearing, tinnitus, vertigo; No sinus or throat pain  NECK: No pain or stiffness  BREASTS: No pain, masses, or nipple discharge  RESPIRATORY: No cough, wheezing, chills or hemoptysis; No shortness of breath  CARDIOVASCULAR: No chest pain, palpitations, dizziness, or leg swelling  GASTROINTESTINAL: No abdominal or epigastric pain. No nausea, vomiting, or hematemesis; No diarrhea or constipation. No melena or hematochezia.  GENITOURINARY: No dysuria, frequency, hematuria, or incontinence  NEUROLOGICAL: No headaches, memory loss, loss of strength, numbness, or tremors  SKIN: No itching, burning, rashes, or lesions   LYMPH NODES: No enlarged glands  ENDOCRINE: No heat or cold intolerance; No hair loss  MUSCULOSKELETAL: No joint pain or swelling; No muscle, back, or extremity pain  PSYCHIATRIC: No depression, anxiety, mood swings, or difficulty sleeping  HEME/LYMPH: No easy bruising, or bleeding gums  ALLERY AND IMMUNOLOGIC: No hives or eczema    RADIOLOGY & ADDITIONAL TESTS:    Consultant(s) Notes Reviewed:  [x ] YES  [ ] NO    PHYSICAL EXAM:  GENERAL: NAD, well-groomed, well-developed,not in any distress ,  HEAD:  Atraumatic, Normocephalic  EYES: EOMI, PERRLA, conjunctiva and sclera clear  ENMT: No tonsillar erythema, exudates, or enlargement; Moist mucous membranes, Good dentition, No lesions  NECK: Supple, No JVD, Normal thyroid  NERVOUS SYSTEM:  Alert & Oriented X3, No focal deficit   CHEST/LUNG: Good air entry bilateral with no  rales, rhonchi, wheezing, or rubs  HEART: Regular rate and rhythm; No murmurs, rubs, or gallops  ABDOMEN: Soft, Nontender, Nondistended; Bowel sounds present  EXTREMITIES:  2+ Peripheral Pulses, No clubbing, cyanosis, or edema  left leg dressed     Care Discussed with Consultants/Other Providers [ x] YES  [ ] NO

## 2017-12-19 NOTE — DISCHARGE NOTE ADULT - MEDICATION SUMMARY - MEDICATIONS TO TAKE
I will START or STAY ON the medications listed below when I get home from the hospital:    spironolactone 25 mg oral tablet  -- 1 tab(s) by mouth once a day  -- Indication: For Water pill    Tylenol 500 mg oral tablet  -- 2 tab(s) by mouth , As Needed  -- Indication: For Pain    Xarelto 20 mg oral tablet  -- 1 tab(s) by mouth once a day (in the evening)  -- Indication: For Atrial fibrillation with controlled ventricular response    bisoprolol 10 mg oral tablet  -- 1 tab(s) by mouth once a day  -- Indication: For Heart    torsemide 10 mg oral tablet  -- 1 tab(s) by mouth once a day  -- Indication: For Water pill    senna oral tablet  -- 2 tab(s) by mouth once a day (at bedtime)  -- Indication: For Constipation    docusate sodium 100 mg oral capsule  -- 1 cap(s) by mouth 3 times a day  -- Indication: For Stool softener    polyethylene glycol 3350 oral powder for reconstitution  -- 17 gram(s) by mouth once a day, As Needed for constipation.  -- Indication: For Constipation    Simbrinza 1%- 0.2% ophthalmic suspension  -- 1 drop(s) in the left eye once a day  -- Indication: For Eye drops    amoxicillin-clavulanate 875 mg-125 mg oral tablet  -- 1 tab(s) by mouth every 12 hours   -- Finish all this medication unless otherwise directed by prescriber.  Take with food or milk.    -- Indication: For Cellulitis of lower extremity    lactobacillus acidophilus oral capsule  -- 1  by mouth once a day   -- Indication: For Probiotic

## 2017-12-19 NOTE — DISCHARGE NOTE ADULT - PATIENT PORTAL LINK FT
“You can access the FollowHealth Patient Portal, offered by [facility], by registering with the following website: [email or web address]”

## 2017-12-19 NOTE — DISCHARGE NOTE ADULT - PROVIDER TOKENS
TOKEN:'[id number]',FREE:[LAST:[Wesley],FIRST:[Gaurav],PHONE:[phone]],FAX:[(   )    -],ADDRESS:[[address] [address]    Phone 1:? [phone]]],FREE:[LAST:[Matt Luz],FIRST:[Fatimah],PHONE:[phone]],FAX:[(   )    -],ADDRESS:[[address] [address]             [address], [address] [address]   Phone : [phone]]]

## 2017-12-19 NOTE — DISCHARGE NOTE ADULT - HOME CARE AGENCY
Your home care services has been referred to [facility] ([phone]).  Please call them to inquire about  time of Registered Nurse visit.

## 2017-12-19 NOTE — PROGRESS NOTE ADULT - PROVIDER SPECIALTY LIST ADULT
Infectious Disease
Internal Medicine
Infectious Disease
Internal Medicine

## 2017-12-19 NOTE — DISCHARGE NOTE ADULT - CARE PLAN
Principal Discharge DX:	Cellulitis of lower extremity, unspecified laterality  Goal:	Resolving. Completed course of IV antibiotics.  Instructions for follow-up, activity and diet:	Take all of your antibiotics as prescribed.  Call your Health Care Provider within two days of arriving home to make a follow up appointment within one week.  If the affected cellulitic area increases in redness, warmth, pain or swelling call your Health Care Provider.  If you develop fever, chills, and/or malaise, call your Health Care Provider.  Secondary Diagnosis:	Atrial fibrillation  Instructions for follow-up, activity and diet:	Atrial fibrillation is the most common heart rhythm problem.  The condition puts you at risk for has stroke and heart attack.  It helps if you control your blood pressure, not drink more than 1-2 alcohol drinks per day, cut down on caffeine, getting treatment for over active thyroid gland, and get regular exercise  Call your doctor if you feel your heart racing or beating unusually, chest tightness or pain, lightheaded, faint, shortness of breath especially with exercise  It is important to take your heart medication as prescribed  You may be on anticoagulation which is very important to take as directed.   Call your Cardiologist for any bleeding or call 911 to go to the closest Emergency Room for severe bleeding.  Secondary Diagnosis:	Hypertension  Instructions for follow-up, activity and diet:	Take your medication as prescribed.  Follow up with your medical doctor for routine blood pressure monitoring, and to establish long term blood pressure treatment goals.  Low salt diet  Activity as tolerated.  Notify your doctor if you have any of the following symptoms:   Dizziness, Lightheadedness, Blurry vision, Headache, Chest pain, Shortness of breath

## 2017-12-20 LAB
CULTURE RESULTS: SIGNIFICANT CHANGE UP
CULTURE RESULTS: SIGNIFICANT CHANGE UP
SPECIMEN SOURCE: SIGNIFICANT CHANGE UP
SPECIMEN SOURCE: SIGNIFICANT CHANGE UP

## 2017-12-22 LAB
CULTURE RESULTS: SIGNIFICANT CHANGE UP
SPECIMEN SOURCE: SIGNIFICANT CHANGE UP

## 2017-12-26 PROBLEM — I10 ESSENTIAL (PRIMARY) HYPERTENSION: Chronic | Status: ACTIVE | Noted: 2017-12-15

## 2017-12-26 LAB — SURGICAL PATHOLOGY STUDY: SIGNIFICANT CHANGE UP

## 2018-01-18 ENCOUNTER — APPOINTMENT (OUTPATIENT)
Dept: WOUND CARE | Facility: CLINIC | Age: 83
End: 2018-01-18
Payer: MEDICARE

## 2018-01-18 VITALS — BODY MASS INDEX: 35.94 KG/M2 | HEIGHT: 67 IN | WEIGHT: 229 LBS

## 2018-01-18 PROCEDURE — 99214 OFFICE O/P EST MOD 30 MIN: CPT | Mod: 25

## 2018-01-18 PROCEDURE — 11042 DBRDMT SUBQ TIS 1ST 20SQCM/<: CPT | Mod: 58

## 2018-01-19 RX ORDER — ACETAMINOPHEN 325 MG/1
TABLET, FILM COATED ORAL
Refills: 0 | Status: ACTIVE | COMMUNITY

## 2018-01-19 RX ORDER — AMOXICILLIN 875 MG/1
875 TABLET, FILM COATED ORAL
Refills: 0 | Status: COMPLETED | COMMUNITY

## 2018-01-19 RX ORDER — BRINZOLAMIDE/BRIMONIDINE TARTRATE 10; 2 MG/ML; MG/ML
1-0.2 SUSPENSION/ DROPS OPHTHALMIC
Refills: 0 | Status: ACTIVE | COMMUNITY

## 2018-02-09 ENCOUNTER — APPOINTMENT (OUTPATIENT)
Dept: WOUND CARE | Facility: CLINIC | Age: 83
End: 2018-02-09
Payer: MEDICARE

## 2018-02-09 PROCEDURE — 99213 OFFICE O/P EST LOW 20 MIN: CPT

## 2018-02-09 RX ORDER — LEVOFLOXACIN 500 MG/1
500 TABLET, FILM COATED ORAL
Qty: 10 | Refills: 0 | Status: COMPLETED | COMMUNITY
Start: 2017-12-13

## 2018-02-09 RX ORDER — CIPROFLOXACIN HYDROCHLORIDE 500 MG/1
500 TABLET, FILM COATED ORAL
Qty: 28 | Refills: 0 | Status: COMPLETED | COMMUNITY
Start: 2017-08-21

## 2018-02-09 RX ORDER — AMOXICILLIN AND CLAVULANATE POTASSIUM 875; 125 MG/1; MG/1
875-125 TABLET, COATED ORAL
Qty: 14 | Refills: 0 | Status: COMPLETED | COMMUNITY
Start: 2017-12-19

## 2018-02-09 RX ORDER — AZITHROMYCIN 500 MG/1
500 TABLET, FILM COATED ORAL
Qty: 7 | Refills: 0 | Status: COMPLETED | COMMUNITY
Start: 2018-01-10

## 2018-02-12 ENCOUNTER — APPOINTMENT (OUTPATIENT)
Dept: VASCULAR SURGERY | Facility: CLINIC | Age: 83
End: 2018-02-12
Payer: MEDICARE

## 2018-02-12 PROCEDURE — 93970 EXTREMITY STUDY: CPT

## 2018-03-06 ENCOUNTER — APPOINTMENT (OUTPATIENT)
Dept: WOUND CARE | Facility: CLINIC | Age: 83
End: 2018-03-06
Payer: MEDICARE

## 2018-03-06 VITALS
TEMPERATURE: 97.7 F | SYSTOLIC BLOOD PRESSURE: 144 MMHG | HEART RATE: 66 BPM | HEIGHT: 67 IN | DIASTOLIC BLOOD PRESSURE: 77 MMHG | RESPIRATION RATE: 18 BRPM

## 2018-03-06 PROCEDURE — 99214 OFFICE O/P EST MOD 30 MIN: CPT

## 2018-03-22 NOTE — H&P ADULT - NS MD HP PULSE CAROTID
left normal/right normal Double Island Pedicle Flap Text: The defect edges were debeveled with a #15 scalpel blade.  Given the location of the defect, shape of the defect and the proximity to free margins a double island pedicle advancement flap was deemed most appropriate.  Using a sterile surgical marker, an appropriate advancement flap was drawn incorporating the defect, outlining the appropriate donor tissue and placing the expected incisions within the relaxed skin tension lines where possible.    The area thus outlined was incised deep to adipose tissue with a #15 scalpel blade.  The skin margins were undermined to an appropriate distance in all directions around the primary defect and laterally outward around the island pedicle utilizing iris scissors.  There was minimal undermining beneath the pedicle flap.

## 2018-04-19 ENCOUNTER — APPOINTMENT (OUTPATIENT)
Dept: WOUND CARE | Facility: CLINIC | Age: 83
End: 2018-04-19
Payer: MEDICARE

## 2018-04-19 VITALS
TEMPERATURE: 97.5 F | BODY MASS INDEX: 35.94 KG/M2 | HEIGHT: 67 IN | DIASTOLIC BLOOD PRESSURE: 95 MMHG | HEART RATE: 60 BPM | WEIGHT: 229 LBS | SYSTOLIC BLOOD PRESSURE: 143 MMHG

## 2018-04-19 DIAGNOSIS — Z79.01 LONG TERM (CURRENT) USE OF ANTICOAGULANTS: ICD-10-CM

## 2018-04-19 DIAGNOSIS — I48.91 UNSPECIFIED ATRIAL FIBRILLATION: ICD-10-CM

## 2018-04-19 PROCEDURE — 99213 OFFICE O/P EST LOW 20 MIN: CPT

## 2018-05-16 PROBLEM — Z79.01 CHRONIC ANTICOAGULATION: Status: RESOLVED | Noted: 2018-01-19 | Resolved: 2018-05-16

## 2018-05-16 PROBLEM — I48.91 ATRIAL FIBRILLATION STATUS POST CARDIOVERSION: Status: RESOLVED | Noted: 2018-01-19 | Resolved: 2018-05-16

## 2019-06-05 ENCOUNTER — APPOINTMENT (OUTPATIENT)
Dept: WOUND CARE | Facility: CLINIC | Age: 84
End: 2019-06-05
Payer: MEDICARE

## 2019-06-05 PROCEDURE — 11042 DBRDMT SUBQ TIS 1ST 20SQCM/<: CPT

## 2019-06-07 ENCOUNTER — APPOINTMENT (OUTPATIENT)
Dept: WOUND CARE | Facility: CLINIC | Age: 84
End: 2019-06-07

## 2019-06-07 NOTE — HISTORY OF PRESENT ILLNESS
[FreeTextEntry1] : 82-year-old with left leg ulcer , now closed tiny open on right side, no fevers, using sock on left needs right also\par off antibiotic given by the hospital. had an infection had a drainage procedure \par discharged December 19/ history of atrial fibrillation on chronic anticoagulation with bruising of left lower extremity additionally, and treatment with vancomycin and the hospital,and Levaquin\par patient has a history of PCI, on spiral lactone\par Pathology results was given.\par \par 6/5/19 patient presents with new traumatic wound to the right shin, stuck it with a bag of frozen polutry.

## 2019-06-07 NOTE — PHYSICAL EXAM
[Normal Breath Sounds] : Normal breath sounds [Normal Rate and Rhythm] : normal rate and rhythm [2+] : left 2+ [Ankle Swelling (On Exam)] : present [Skin Ulcer] : ulcer [Alert] : alert [Oriented to Person] : oriented to person [Oriented to Place] : oriented to place [Oriented to Time] : oriented to time [Calm] : calm [4 x 4] : 4 x 4  [JVD] : no jugular venous distention  [Varicose Veins Of Lower Extremities] : not present [de-identified] : nad gcs 15 [de-identified] : normal [de-identified] : Nontender [FreeTextEntry1] : shin [FreeTextEntry3] : 0.6 [FreeTextEntry2] : 1 [FreeTextEntry4] : 0.4 [de-identified] : stephan

## 2019-06-07 NOTE — ASSESSMENT
[FreeTextEntry1] : 82-year-old with right leg ulcer, history of peripheral vascular disease and swelling s/p infection with afib\par  coronary artery issues with atrial fibrillation on chronic anticoagulation / no reflux or dvt feb 2018 \par  status post trauma,had closed with stephan\par  No clinical sign of infection\par  Supplies via [email or web address]\par New home care \par stephan/multilayer dressing

## 2019-06-14 ENCOUNTER — APPOINTMENT (OUTPATIENT)
Dept: VASCULAR SURGERY | Facility: CLINIC | Age: 84
End: 2019-06-14
Payer: MEDICARE

## 2019-06-14 PROCEDURE — 93970 EXTREMITY STUDY: CPT

## 2019-06-17 ENCOUNTER — APPOINTMENT (OUTPATIENT)
Dept: WOUND CARE | Facility: CLINIC | Age: 84
End: 2019-06-17
Payer: MEDICARE

## 2019-06-17 VITALS — RESPIRATION RATE: 16 BRPM | SYSTOLIC BLOOD PRESSURE: 140 MMHG | DIASTOLIC BLOOD PRESSURE: 70 MMHG | HEART RATE: 64 BPM

## 2019-06-17 PROCEDURE — 11042 DBRDMT SUBQ TIS 1ST 20SQCM/<: CPT

## 2019-06-17 NOTE — DATA REVIEWED
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

## 2019-06-17 NOTE — ASSESSMENT
[FreeTextEntry1] : 82-year-old with right leg ulcer, history of peripheral vascular disease and swelling s/p infection with afib\par  coronary artery issues with atrial fibrillation on chronic anticoagulation / no reflux or dvt feb 2018 \par  status post trauma,had closed with stephan\par  No clinical sign of infection\par Wound clean and pink\par Plan - continue with stephan, compression sock \par

## 2019-06-17 NOTE — PHYSICAL EXAM
[Normal Breath Sounds] : Normal breath sounds [Normal Rate and Rhythm] : normal rate and rhythm [2+] : left 2+ [Ankle Swelling (On Exam)] : present [Skin Ulcer] : ulcer [Alert] : alert [Oriented to Person] : oriented to person [Oriented to Place] : oriented to place [Oriented to Time] : oriented to time [Calm] : calm [4 x 4] : 4 x 4  [JVD] : no jugular venous distention  [de-identified] : nad gcs 15 [Varicose Veins Of Lower Extremities] : not present [de-identified] : normal [de-identified] : Nontender [FreeTextEntry1] : shin [FreeTextEntry2] : 2 [FreeTextEntry4] : 0.2 [FreeTextEntry3] : 1.5 [de-identified] : 1/0.6/0.4 [de-identified] : stephan

## 2019-07-10 ENCOUNTER — APPOINTMENT (OUTPATIENT)
Dept: WOUND CARE | Facility: CLINIC | Age: 84
End: 2019-07-10
Payer: MEDICARE

## 2019-07-10 VITALS
HEART RATE: 80 BPM | DIASTOLIC BLOOD PRESSURE: 91 MMHG | BODY MASS INDEX: 33.36 KG/M2 | SYSTOLIC BLOOD PRESSURE: 142 MMHG

## 2019-07-10 PROCEDURE — 99213 OFFICE O/P EST LOW 20 MIN: CPT

## 2019-07-11 NOTE — ASSESSMENT
[FreeTextEntry1] : 82-year-old with right leg ulcer, history of peripheral vascular disease and swelling s/p infection with afib\par  coronary artery issues with atrial fibrillation on chronic anticoagulation / no reflux or dvt feb 2018 \par  status post trauma,had closed with stephan\par  No clinical sign of infection\par Wound healed\par Plan - continue compression stockings\par

## 2019-07-11 NOTE — PHYSICAL EXAM
[Normal Rate and Rhythm] : normal rate and rhythm [Normal Breath Sounds] : Normal breath sounds [Ankle Swelling (On Exam)] : present [2+] : left 2+ [Alert] : alert [Skin Ulcer] : ulcer [Oriented to Person] : oriented to person [Oriented to Time] : oriented to time [Oriented to Place] : oriented to place [Calm] : calm [JVD] : no jugular venous distention  [Varicose Veins Of Lower Extremities] : not present [de-identified] : nad gcs 15 [de-identified] : normal [de-identified] : Nontender [FreeTextEntry1] : leg [FreeTextEntry3] : 0 [FreeTextEntry2] : 0 [FreeTextEntry4] : 0

## 2019-09-11 NOTE — H&P ADULT - NS MD HP PULSE DORSALIS
Medical Week 2 Survey      Responses   Facility patient discharged from?  [facility]   Does the patient have one of the following disease processes/diagnoses(primary or secondary)?  Other   Week 2 attempt successful?  No   Unsuccessful attempts  Attempt 1          Marie Stephens RN  
right normal/left normal

## 2020-06-22 ENCOUNTER — APPOINTMENT (OUTPATIENT)
Dept: WOUND CARE | Facility: CLINIC | Age: 85
End: 2020-06-22
Payer: MEDICARE

## 2020-06-22 ENCOUNTER — NON-APPOINTMENT (OUTPATIENT)
Age: 85
End: 2020-06-22

## 2020-06-22 VITALS — WEIGHT: 213 LBS | TEMPERATURE: 97.2 F | HEIGHT: 67 IN | BODY MASS INDEX: 33.43 KG/M2

## 2020-06-22 PROCEDURE — 27607 TREAT LOWER LEG BONE LESION: CPT

## 2020-06-22 NOTE — ASSESSMENT
[FreeTextEntry1] : Wound Assessment and Plan:\par \par The patient presents with a wound to the right leg, I&D performed  Swelling noted to the extremity.  \par EVERETTE/PVR and standing venous reflux study scheduled.\par No clinical sign of infection\par Recommendation:\par \par Apply lidocaine or topical anesthetic if needed to reduce pain upon washing the wound.\par Wash wound with ----Dove skin sensitive soap and clean water \par Pack with 1/4in iodoform\par Change dressing ---daily\par Leg elevation as tolerated\par Encouraged ambulation or exercise.\par Optimization of nutrition.\par \par -----Wound supplies ordered via [facility]  iodoform, compression\par Patient given contact information to [facility]\par \par -----Homecare orders in place\par \par Follow up appointment scheduled for 1 week with telehealth\par \par TeleHealth Services discussed with the patient and/or family.  Discharge instructions given including download of [facility] information regarding:\par 1)  [facility] to obtain medical records\par 2)  [facility] [facility] to conduct Face-to-Face TeleHealth visit\par 3)  [employer] for the Patient (patient takes a picture of their wound which is sent to the patient's chart for review)\par \par

## 2020-06-22 NOTE — CONSULT LETTER
[Please see my note below.] : Please see my note below. [Consult Letter:] : I had the pleasure of evaluating your patient, [unfilled]. [Consult Closing:] : Thank you very much for allowing me to participate in the care of this patient.  If you have any questions, please do not hesitate to contact me. [Sincerely,] : Sincerely,

## 2020-06-22 NOTE — PHYSICAL EXAM
[Normal Rate and Rhythm] : normal rate and rhythm [Normal Breath Sounds] : Normal breath sounds [2+] : right 2+ [Ankle Swelling (On Exam)] : present [Skin Ulcer] : ulcer [Oriented to Person] : oriented to person [Alert] : alert [Oriented to Place] : oriented to place [Oriented to Time] : oriented to time [Calm] : calm [Please See PDF for [employer]] : Please See PDF for [employer]. [JVD] : no jugular venous distention  [de-identified] : nad gcs 15 [Varicose Veins Of Lower Extremities] : not present [de-identified] : Nontender [de-identified] : normal [FreeTextEntry1] : leg [FreeTextEntry3] : 0 [TWNoteComboBox1] : Right [FreeTextEntry2] : 0 [FreeTextEntry4] : 0

## 2020-06-28 LAB — BACTERIA SPEC CULT: NORMAL

## 2020-07-17 ENCOUNTER — APPOINTMENT (OUTPATIENT)
Dept: WOUND CARE | Facility: CLINIC | Age: 85
End: 2020-07-17

## 2020-07-23 ENCOUNTER — APPOINTMENT (OUTPATIENT)
Dept: WOUND CARE | Facility: CLINIC | Age: 85
End: 2020-07-23
Payer: MEDICARE

## 2020-07-23 PROCEDURE — 99213 OFFICE O/P EST LOW 20 MIN: CPT | Mod: 95

## 2020-07-23 NOTE — HISTORY OF PRESENT ILLNESS
[Home] : at home, [unfilled] , at the time of the visit. [Verbal consent obtained from patient] : the patient, [unfilled] [Medical Office: ([facility])___] : at the medical office located in  [Formal Caregiver] : formal caregiver [FreeTextEntry1] : VN reports that wound of right leg has closed\par Moisturizes daily - additional products advised other than coca butter currently in use\par VN will measure for a new compression stocking\par Is compliant with diuretic

## 2020-07-23 NOTE — ASSESSMENT
[FreeTextEntry1] : Declines office apt\par VN to measure for compression stockings\par Vascular studies advised\par Phone connection difficult\par No [employer] submitted

## 2020-07-23 NOTE — PHYSICAL EXAM
[Alert] : alert [de-identified] : Elderly AA female  [de-identified] : non labored [de-identified] : limited ambulation [de-identified] : wound is closed [FreeTextEntry1] : lower leg [de-identified] : Other [TWNoteComboBox1] : Right

## 2020-07-24 ENCOUNTER — APPOINTMENT (OUTPATIENT)
Dept: WOUND CARE | Facility: CLINIC | Age: 85
End: 2020-07-24

## 2020-07-31 ENCOUNTER — APPOINTMENT (OUTPATIENT)
Dept: WOUND CARE | Facility: CLINIC | Age: 85
End: 2020-07-31

## 2020-11-20 ENCOUNTER — APPOINTMENT (OUTPATIENT)
Dept: WOUND CARE | Facility: CLINIC | Age: 85
End: 2020-11-20
Payer: MEDICARE

## 2020-11-20 VITALS — TEMPERATURE: 97.6 F | HEART RATE: 80 BPM | SYSTOLIC BLOOD PRESSURE: 154 MMHG | DIASTOLIC BLOOD PRESSURE: 88 MMHG

## 2020-11-20 DIAGNOSIS — T14.90XA INJURY, UNSPECIFIED, INITIAL ENCOUNTER: ICD-10-CM

## 2020-11-20 DIAGNOSIS — Z87.2 PERSONAL HISTORY OF DISEASES OF THE SKIN AND SUBCUTANEOUS TISSUE: ICD-10-CM

## 2020-11-20 PROCEDURE — 99213 OFFICE O/P EST LOW 20 MIN: CPT

## 2020-11-20 RX ORDER — LEVOFLOXACIN 500 MG/1
500 TABLET, FILM COATED ORAL DAILY
Qty: 7 | Refills: 0 | Status: DISCONTINUED | COMMUNITY
Start: 2020-06-22 | End: 2020-11-20

## 2020-11-20 RX ORDER — LORATADINE 5 MG
17 TABLET,CHEWABLE ORAL
Refills: 0 | Status: DISCONTINUED | COMMUNITY
End: 2020-11-20

## 2020-11-20 RX ORDER — SENNOSIDES 8.6 MG
TABLET ORAL
Refills: 0 | Status: DISCONTINUED | COMMUNITY
End: 2020-11-20

## 2020-11-20 RX ORDER — DOCUSATE SODIUM 100 MG
100 TABLET ORAL
Refills: 0 | Status: DISCONTINUED | COMMUNITY
End: 2020-11-20

## 2020-11-20 RX ORDER — TORSEMIDE 10 MG/1
10 TABLET ORAL
Qty: 90 | Refills: 0 | Status: DISCONTINUED | COMMUNITY
Start: 2017-06-22 | End: 2020-11-20

## 2020-12-02 ENCOUNTER — APPOINTMENT (OUTPATIENT)
Dept: WOUND CARE | Facility: CLINIC | Age: 85
End: 2020-12-02
Payer: MEDICARE

## 2020-12-02 VITALS
DIASTOLIC BLOOD PRESSURE: 80 MMHG | RESPIRATION RATE: 16 BRPM | SYSTOLIC BLOOD PRESSURE: 146 MMHG | TEMPERATURE: 97.2 F | HEART RATE: 81 BPM

## 2020-12-02 PROCEDURE — 29581 APPL MULTLAYER CMPRN SYS LEG: CPT | Mod: RT

## 2020-12-02 NOTE — PLAN
[FreeTextEntry1] : 12/2/20\par Plan - leg elevation, continue with kerramax , supplies ordered\par follow up in office when vascular testing done

## 2020-12-02 NOTE — PHYSICAL EXAM
[Alert] : alert [Please See PDF for [employer]] : Please See PDF for [employer]. [de-identified] : Elderly AA female  [de-identified] : non labored [FreeTextEntry1] : bilateral stasis dermatitis [de-identified] : limited ambulation

## 2020-12-02 NOTE — ASSESSMENT
[FreeTextEntry1] : s/p incision and drainage\par The patient presents with a wound to the right lower extremity.  Swelling noted to the extremity.  \par EVERETTE/PVR: scheduled\par No clinical sign of infection\par Recommendation:\par \par Apply lidocaine or topical anesthetic.\par Wash wound with ----0.9% saline/ Dakins 0.125%.\par Apply ---foam/\par Apply --- compression stocking \par Change dressing ---daily\par Leg elevation as tolerated\par Encouraged ambulation or exercise.\par Optimization of nutrition.\par Offloading to the wound site.\par \par ---Group II  Pressure relief mattress/boots recommended\par ---Roho cushion recommended\par \par -----Wound supplies ordered via [employer]\par Patient given contact information\par \par -----Homecare orders in place\par \par Follow up appointment scheduled for 1 week telehealth\par \par \par \par Vascular studies scheduled\par 12/2/20\par Pt reports clear drainage large amounts, has to change 2x/day, using kerramax over to absorb\par vascular studies  scheduled 2 weeks\par

## 2020-12-14 ENCOUNTER — APPOINTMENT (OUTPATIENT)
Dept: WOUND CARE | Facility: CLINIC | Age: 85
End: 2020-12-14
Payer: MEDICARE

## 2020-12-14 VITALS
DIASTOLIC BLOOD PRESSURE: 84 MMHG | RESPIRATION RATE: 16 BRPM | SYSTOLIC BLOOD PRESSURE: 147 MMHG | HEART RATE: 89 BPM | TEMPERATURE: 96.4 F

## 2020-12-14 PROCEDURE — 93923 UPR/LXTR ART STDY 3+ LVLS: CPT

## 2020-12-14 PROCEDURE — 29581 APPL MULTLAYER CMPRN SYS LEG: CPT | Mod: RT

## 2020-12-14 PROCEDURE — 93970 EXTREMITY STUDY: CPT

## 2020-12-20 PROBLEM — T14.90XA CLOSED WOUND: Status: ACTIVE | Noted: 2019-07-10

## 2020-12-20 NOTE — HISTORY OF PRESENT ILLNESS
[FreeTextEntry1] : Ms. KATERINA GALLOWAY   presents to the office with a blister now resolved.  The wound is located on  the leg      The patient denies fevers or chills.  The patient had not replaced her compression stockings.\par VN reports that wound of right leg has closed\par Moisturizes daily - additional products advised other than coca butter currently in use\par VN will measure for a new compression stocking\par Is compliant with diuretic\par \par 11/20/2020\par New blister appeared on leg 11/16/2020. Patient states she has been unable to wear her compression stockings as she is unable to put them on by herself. \par Patient underwent evaluation for peripheral arterial disease using a [employer] diagnostic machine.  Ankle brachial index was obtained using blood pressure cuffs of the ankle and brachial segments of both legs.  A distal pulse volume recording was noted digitally on the device.  The procedure was supervised and interpreted by the physician.  EVERETTE of the right lower extremity PAD.   EVERETTE of the left lower extremity -0.87.  Waveform noted to be  biphasic.   Patient tolerated procedure well in the office.  Results discussed with the patient.\par \par \par

## 2020-12-20 NOTE — ASSESSMENT
[FreeTextEntry1] : s/p incision and drainage wound healed\par The patient presents with a wound to the right lower extremity.  Swelling noted to the extremity.  \par EVERETTE/PVR: scheduled\par No clinical sign of infection\par Recommendation:\par Patient measured for compression stockings.\par \par \par \par \par Vascular studies scheduled\par

## 2020-12-20 NOTE — PHYSICAL EXAM
[Alert] : alert [de-identified] : Elderly AA female  [de-identified] : non labored [de-identified] : limited ambulation [FreeTextEntry1] : lower leg [de-identified] : wound is closed [TWNoteComboBox1] : Right [de-identified] : Other

## 2020-12-30 ENCOUNTER — APPOINTMENT (OUTPATIENT)
Dept: WOUND CARE | Facility: CLINIC | Age: 85
End: 2020-12-30

## 2021-01-13 NOTE — PLAN
[FreeTextEntry1] : 12/2/20\par Plan - leg elevation, continue with kerramax , supplies ordered\par follow up in office when vascular testing done\par 12/4/20\par

## 2021-01-13 NOTE — HISTORY OF PRESENT ILLNESS
[FreeTextEntry1] : VN reports that wound of right leg has closed\par Moisturizes daily - additional products advised other than coca butter currently in use\par VN will measure for a new compression stocking\par Is compliant with diuretic\par \par 11/20/2020\par New blister appeared on leg 11/16/2020. Patient states she has been unable to wear her compression stockings as she is unable to put them on by herself. \par Patient underwent evaluation for peripheral arterial disease using a [employer] diagnostic machine.  Ankle brachial index was obtained using blood pressure cuffs of the ankle and brachial segments of both legs.  A distal pulse volume recording was noted digitally on the device.  The procedure was supervised and interpreted by the physician.  EVERETTE of the right lower extremity PAD.   EVERETTE of the left lower extremity 0.87.  Waveform noted to be  biphasic.   Patient tolerated procedure well in the office.  Results discussed with the patient.\par \par \par

## 2021-01-13 NOTE — PHYSICAL EXAM
[Alert] : alert [Please See PDF for [employer]] : Please See PDF for [employer]. [de-identified] : Elderly AA female  [de-identified] : non labored [FreeTextEntry1] : bilateral stasis dermatitis [de-identified] : limited ambulation

## 2021-01-13 NOTE — ASSESSMENT
[FreeTextEntry1] : s/p incision and drainage\par The patient presents with a wound to the right lower extremity.  Swelling noted to the extremity.  \par EVERETTE/PVR: and venous reflux done and results discussed with patient.  wnl\par Edema noted\par No clinical sign of infection\par Recommendation:\par \par Apply lidocaine or topical anesthetic.\par Wash wound with ----0.9% saline/\par Apply ---foam/ Kerramax\par Apply --- compression stocking \par Change dressing ---daily\par Leg elevation as tolerated\par Encouraged ambulation or exercise.\par Optimization of nutrition.\par Offloading to the wound site.\par \par -----Wound supplies ordered via [employer]\par Patient given contact information\par \par -----Homecare orders ideclined\par \par Follow up appointment scheduled for 1 week telehealth\par \par \par \par Vascular studies scheduled\par 12/2/20\par Pt reports clear drainage large amounts, has to change 2x/day, using kerramax over to absorb\par vascular studies  scheduled 2 weeks  wound improved\par no clinical sign of infection\par Chronic Lymphedema\par

## 2021-01-20 ENCOUNTER — APPOINTMENT (OUTPATIENT)
Dept: WOUND CARE | Facility: CLINIC | Age: 86
End: 2021-01-20
Payer: MEDICARE

## 2021-01-20 PROCEDURE — 99213 OFFICE O/P EST LOW 20 MIN: CPT | Mod: 95

## 2021-01-21 NOTE — HISTORY OF PRESENT ILLNESS
[Home] : at home, [unfilled] , at the time of the visit. [Medical Office: ([facility])___] : at the medical office located in  [Family Member] : family member [Verbal consent obtained from patient] : the patient, [unfilled] [FreeTextEntry4] : Kem NP [FreeTextEntry1] : VN reports that wound of right leg has closed\par Moisturizes daily - additional products advised other than coca butter currently in use\par VN will measure for a new compression stocking\par Is compliant with diuretic\par \par 11/20/2020\par New blister appeared on leg 11/16/2020. Patient states she has been unable to wear her compression stockings as she is unable to put them on by herself. \par Patient underwent evaluation for peripheral arterial disease using a [employer] diagnostic machine.  Ankle brachial index was obtained using blood pressure cuffs of the ankle and brachial segments of both legs.  A distal pulse volume recording was noted digitally on the device.  The procedure was supervised and interpreted by the physician.  EVERETTE of the right lower extremity PAD.   EVERETTE of the left lower extremity 0.87.  Waveform noted to be  biphasic.   Patient tolerated procedure well in the office.  Results discussed with the patient.\par \par \par

## 2021-01-21 NOTE — ASSESSMENT
[FreeTextEntry1] : s/p incision and drainage\par The patient presents with a wound to the right lower extremity.  Swelling noted to the extremity.  \par EVERETTE/PVR: and venous reflux done and results discussed with patient.  wnl\par Edema noted\par No clinical sign of infection\par Recommendation:\par \par Apply lidocaine or topical anesthetic.\par Wash wound with ----0.9% saline/\par Apply ---foam/ Kerramax\par Apply --- compression stocking \par Change dressing ---daily\par Leg elevation as tolerated\par Encouraged ambulation or exercise.\par Optimization of nutrition.\par Offloading to the wound site.\par \par -----Wound supplies ordered via [employer]\par Patient given contact information\par \par -----Homecare orders ideclined\par \par Follow up appointment scheduled for 1 week telehealth\par \par \par \par Vascular studies scheduled\par 12/2/20\par Pt reports clear drainage large amounts, has to change 2x/day, using kerramax over to absorb\par vascular studies  scheduled 2 weeks  wound improved\par no clinical sign of infection\par Chronic Lymphedema\par 1/20/21\par Wounds healed, dry and scabbed, just received lymphedema pump and has been using, using aquaphor for dry skin\par

## 2021-01-21 NOTE — PHYSICAL EXAM
[Alert] : alert [Please See PDF for [employer]] : Please See PDF for [employer]. [de-identified] : Elderly AA female  [de-identified] : non labored [FreeTextEntry1] : bilateral stasis dermatitis [de-identified] : limited ambulation

## 2021-01-21 NOTE — PLAN
[FreeTextEntry1] : 1/20/21\par Plan - vascular testing showed no reflux, normal EVERETTE, wounds have healed, takes diuretic, wounds probably related to fluid overload, advised to c/w daily use of lymphedema pump, compression garment to reduce swelling\par No need for follow up, if anything should arise, to contact office

## 2021-09-03 NOTE — H&P ADULT - SKIN/BREAST
REPORT OF WORK ABILITY    NOTE TO EMPLOYEE: You must promptly provide a copy of this report to your  employer or worker's compensation insurer, and Qualified Rehabilitation Consultant.    Date: September 3, 2021                   Employee Name: Gerardo Valenzuela         YOB: 1958  Medical Record Number: [id number]   Soc.Sec.No: [id number]  Employer: None                Date of Injury: 9/12/20  Managed Care Organization / Insurance Company Name: UNKNOWN    Diagnosis: right knee injury; underlying knee arthrosis and meniscus tear  Work Related: yes     MMI: NO  Permanent Partial Disability (PPD) likely: UNKNOWN    EMPLOYEE IS ABLE TO WORK: Return to work with restrictions on next scheduled work date. Restrictions in place 6 weeks. She may contact clinic with update in 1-2 weeks if improving from injection well.     RESTRICTIONS IF ANY:  Remain unchanged.  Stand:  Frequently (4-6 hours)  Sit:  Full time is ok  Walk: frequently (4-6 hours)  Kneel/Slaughter:  Not at all (0 hours)  Lift, carry:  Up to 20 lbs  Push/Pull:  Up to 20 lbs      OTHER RESTRICTIONS: None    TREATMENT PLAN/NOTES: change work position for comfort, best work height mid chest to mid thigh, may need to restrict work activities for comfort, may continue with knee brace and cold pack for comfort, Elevate and Rest if needed. Continue therapy exercises also. Right knee steroid injection done today.            Ronen YOUSIF   details…

## 2021-12-30 ENCOUNTER — TRANSCRIPTION ENCOUNTER (OUTPATIENT)
Age: 86
End: 2021-12-30

## 2022-01-28 ENCOUNTER — APPOINTMENT (OUTPATIENT)
Dept: WOUND CARE | Facility: CLINIC | Age: 87
End: 2022-01-28
Payer: MEDICARE

## 2022-01-28 VITALS — TEMPERATURE: 95.9 F

## 2022-01-28 PROCEDURE — 99213 OFFICE O/P EST LOW 20 MIN: CPT

## 2022-01-29 RX ORDER — LATANOPROST/PF 0.005 %
0.01 DROPS OPHTHALMIC (EYE)
Qty: 2 | Refills: 0 | Status: ACTIVE | COMMUNITY
Start: 2022-01-05

## 2022-01-29 RX ORDER — LOSARTAN POTASSIUM 25 MG/1
25 TABLET, FILM COATED ORAL
Qty: 90 | Refills: 0 | Status: ACTIVE | COMMUNITY
Start: 2021-09-17

## 2022-01-29 RX ORDER — RIVAROXABAN 15 MG/1
15 TABLET, FILM COATED ORAL
Qty: 30 | Refills: 0 | Status: ACTIVE | COMMUNITY
Start: 2022-01-20

## 2022-01-29 RX ORDER — PANTOPRAZOLE 40 MG/1
40 TABLET, DELAYED RELEASE ORAL
Qty: 30 | Refills: 0 | Status: ACTIVE | COMMUNITY
Start: 2022-01-20

## 2022-01-31 NOTE — HISTORY OF PRESENT ILLNESS
[FreeTextEntry1] : new ulcer, draining a lot no fever \par last visit here  \par VN reports that wound of right leg has closed in1/20/21Wounds healed, dry and scabbed, just received lymphedema pump and has been using, using aquaphor for dry skin\par Moisturizes daily - additional products advised other than coca butter currently in use\par VN will measure for a new compression stocking\par Is compliant with diuretic\par no reflux on testing nl everette has pump and overload had closed last visit\par 11/20/2020New blister appeared on leg 11/16/2020. Patient states she has been unable to wear her compression stockings as she is unable to put them on by herself. \par Patient underwent evaluation for peripheral arterial disease using a [employer] diagnostic machine.  Ankle brachial index was obtained using blood pressure cuffs of the ankle and brachial segments of both legs.  A distal pulse volume recording was noted digitally on the device.  The procedure was supervised and interpreted by the physician.  EVERETTE of the right lower extremity PAD.   EVERETTE of the left lower extremity 0.87.  Waveform noted to be  biphasic.   Patient tolerated procedure well in the office.  Results discussed with the patient.\par \par \par

## 2022-01-31 NOTE — ASSESSMENT
[FreeTextEntry1] : 86 yr w with  cellulitis and overload htn afib now on xarelto\par no bleeding, moleculight neg\par The patient presents with a wound to the right lower extremity.  Swelling noted to the extremity.  \par EVERETTE/PVR: and venous reflux done and results discussed with patient.  wnl\par Edema noted\par No clinical sign of infection\par Recommendation:\par \par Apply lidocaine or topical anesthetic.\par Wash wound with ----0.9% saline/\par Apply ---foam/ Kerramax dynaflex extrasorbankle right and left 28.5\par Apply --- compression stocking \par Change dressing ---daily\par Leg elevation as tolerated\par Encouraged ambulation or exercise.\par Optimization of nutrition.\par Offloading to the wound site.\par \par -----Wound supplies  Patient given contact information extrasorb , betadine and alginate on wounds 3x week changes kerlex ace\par \par -----Homecare orders done\par \par Follow up appointment scheduled for 1 week telehealth\par Vascular studies scheduled\par   no clinical sign of infection\par Chronic Lymphedema\par \par

## 2022-01-31 NOTE — PHYSICAL EXAM
[Alert] : alert [Please See PDF for [employer]] : Please See PDF for [employer]. [Normal Breath Sounds] : Normal breath sounds [Normal Rate and Rhythm] : normal rate and rhythm [2+] : left 2+ [Ankle Swelling (On Exam)] : present [Skin Ulcer] : ulcer [Skin Induration] : induration [Oriented to Place] : oriented to place [Oriented to Time] : oriented to time [Calm] : calm [JVD] : no jugular venous distention  [Abdomen Tenderness] : ~T ~M No abdominal tenderness [de-identified] : Elderly AA female  [de-identified] : joe [de-identified] : non labored [FreeTextEntry1] : bilateral stasis dermatitis [de-identified] : limited ambulation

## 2022-02-11 ENCOUNTER — APPOINTMENT (OUTPATIENT)
Dept: WOUND CARE | Facility: CLINIC | Age: 87
End: 2022-02-11
Payer: MEDICARE

## 2022-02-11 VITALS
DIASTOLIC BLOOD PRESSURE: 84 MMHG | TEMPERATURE: 98 F | SYSTOLIC BLOOD PRESSURE: 127 MMHG | RESPIRATION RATE: 16 BRPM | BODY MASS INDEX: 33.67 KG/M2 | HEART RATE: 82 BPM | WEIGHT: 215 LBS

## 2022-02-11 PROCEDURE — 99213 OFFICE O/P EST LOW 20 MIN: CPT

## 2022-02-15 LAB — BACTERIA SPEC CULT: ABNORMAL

## 2022-02-21 NOTE — PHYSICAL EXAM
[Normal Breath Sounds] : Normal breath sounds [Normal Rate and Rhythm] : normal rate and rhythm [2+] : left 2+ [Ankle Swelling (On Exam)] : present [Skin Ulcer] : ulcer [Skin Induration] : induration [Alert] : alert [Oriented to Place] : oriented to place [Oriented to Time] : oriented to time [Calm] : calm [Please See PDF for [employer]] : Please See PDF for [employer]. [JVD] : no jugular venous distention  [Abdomen Tenderness] : ~T ~M No abdominal tenderness [de-identified] : Elderly AA female  [de-identified] : joe [FreeTextEntry1] : bilateral stasis dermatitis [de-identified] : non labored [de-identified] : limited ambulation

## 2022-02-21 NOTE — PLAN
[FreeTextEntry1] : 2/11/22\par Plan - vascular studies to be ordered\par left message for nurse\par needs super absorber over wound, unna or dynaflex\par use lymphedema pump daily\par script for compression garment given\par follow up 2 weeks

## 2022-02-21 NOTE — ASSESSMENT
[FreeTextEntry1] : 86 yr w with  cellulitis and overload htn afib now on xarelto\par no bleeding, moleculight neg\par The patient presents with a wound to the right lower extremity.  Swelling noted to the extremity.  \par EVERETTE/PVR: and venous reflux done and results discussed with patient.  wnl\par Edema noted\par No clinical sign of infection\par Recommendation:\par \par Apply lidocaine or topical anesthetic.\par Wash wound with ----0.9% saline/\par Apply ---foam/ Kerramax dynaflex extrasorbankle right and left 28.5\par Apply --- compression stocking \par Change dressing ---daily\par Leg elevation as tolerated\par Encouraged ambulation or exercise.\par Optimization of nutrition.\par Offloading to the wound site.\par \par 2/11/22\par Right leg venous ulcer , drainage is copios, only aquacel over, wound pink, malodorous, culture obtained, no compression on leg, has a nurse\par not using lymphedema pump\par no recent vascular studies\par measured for compression garments\par \par

## 2022-02-21 NOTE — HISTORY OF PRESENT ILLNESS
[FreeTextEntry1] : new ulcer, draining a lot no fever \par last visit here  \par VN reports that wound of right leg has closed in1/20/21Wounds healed, dry and scabbed, just received lymphedema pump and has been using, using aquaphor for dry skin\par Moisturizes daily - additional products advised other than coca butter currently in use\par VN will measure for a new compression stocking\par Is compliant with diuretic\par no reflux on testing nl everette has pump and overload had closed last visit\par 11/20/2020New blister appeared on leg 11/16/2020. Patient states she has been unable to wear her compression stockings as she is unable to put them on by herself. \par Patient underwent evaluation for peripheral arterial disease using a [employer] diagnostic machine.  Ankle brachial index was obtained using blood pressure cuffs of the ankle and brachial segments of both legs.  A distal pulse volume recording was noted digitally on the device.  The procedure was supervised and interpreted by the physician.  EVERETTE of the right lower extremity PAD.   EVERETTE of the left lower extremity 0.87.  Waveform noted to be  biphasic.   Patient tolerated procedure well in the office.  Results discussed with the patient.\par \par \par

## 2022-02-22 ENCOUNTER — APPOINTMENT (OUTPATIENT)
Dept: WOUND CARE | Facility: CLINIC | Age: 87
End: 2022-02-22

## 2022-02-25 ENCOUNTER — APPOINTMENT (OUTPATIENT)
Dept: WOUND CARE | Facility: CLINIC | Age: 87
End: 2022-02-25
Payer: MEDICARE

## 2022-02-25 VITALS
SYSTOLIC BLOOD PRESSURE: 138 MMHG | DIASTOLIC BLOOD PRESSURE: 74 MMHG | TEMPERATURE: 97 F | HEART RATE: 81 BPM | RESPIRATION RATE: 18 BRPM

## 2022-02-25 PROCEDURE — 99213 OFFICE O/P EST LOW 20 MIN: CPT

## 2022-02-25 RX ORDER — METRONIDAZOLE 375 MG/1
375 CAPSULE ORAL 3 TIMES DAILY
Qty: 42 | Refills: 0 | Status: ACTIVE | COMMUNITY
Start: 2022-02-25 | End: 1900-01-01

## 2022-02-28 NOTE — HISTORY OF PRESENT ILLNESS
[FreeTextEntry1] : new ulcer, draining a lot no fever \par last visit here  \par VN reports that wound of right leg has closed in1/20/21Wounds healed, dry and scabbed, just received lymphedema pump and has been using, using aquaphor for dry skin\par Moisturizes daily - additional products advised other than coca butter currently in use\par VN will measure for a new compression stocking\par Is compliant with diuretic\par no reflux on testing nl everette has pump and overload had closed last visit\par 11/20/2020New blister appeared on leg 11/16/2020. Patient states she has been unable to wear her compression stockings as she is unable to put them on by herself. \par Patient underwent evaluation for peripheral arterial disease using a [employer] diagnostic machine.  Ankle brachial index was obtained using blood pressure cuffs of the ankle and brachial segments of both legs.  A distal pulse volume recording was noted digitally on the device.  The procedure was supervised and interpreted by the physician.  EVERETTE of the right lower extremity PAD.   EVERETTE of the left lower extremity 0.87.  Waveform noted to be  biphasic.   Patient tolerated procedure well in the office.  Results discussed with the patient.\par \par \par

## 2022-02-28 NOTE — PHYSICAL EXAM
[Normal Breath Sounds] : Normal breath sounds [Normal Rate and Rhythm] : normal rate and rhythm [2+] : left 2+ [Ankle Swelling (On Exam)] : present [Skin Ulcer] : ulcer [Skin Induration] : induration [Alert] : alert [Oriented to Place] : oriented to place [Oriented to Time] : oriented to time [Calm] : calm [Please See PDF for [employer]] : Please See PDF for [employer]. [JVD] : no jugular venous distention  [Abdomen Tenderness] : ~T ~M No abdominal tenderness [de-identified] : joe [de-identified] : Elderly AA female  [de-identified] : non labored [FreeTextEntry1] : bilateral stasis dermatitis [de-identified] : limited ambulation

## 2022-02-28 NOTE — PLAN
[FreeTextEntry1] :  \par Plan - vascular studies to be ordered\par left message for nurse\par swollen\par drawtex and extrasorb\par add flagyl and renew bactrimds\par needs super absorber over wound, unna or dynaflex\par use lymphedema pump daily\par script for compression garment given\par follow up 2 weeks

## 2022-03-10 ENCOUNTER — APPOINTMENT (OUTPATIENT)
Dept: ULTRASOUND IMAGING | Facility: HOSPITAL | Age: 87
End: 2022-03-10

## 2022-03-11 ENCOUNTER — APPOINTMENT (OUTPATIENT)
Dept: WOUND CARE | Facility: CLINIC | Age: 87
End: 2022-03-11
Payer: MEDICARE

## 2022-03-11 VITALS
RESPIRATION RATE: 16 BRPM | SYSTOLIC BLOOD PRESSURE: 138 MMHG | DIASTOLIC BLOOD PRESSURE: 78 MMHG | HEART RATE: 72 BPM | BODY MASS INDEX: 34.61 KG/M2 | WEIGHT: 221 LBS | TEMPERATURE: 97.5 F

## 2022-03-11 PROCEDURE — 99213 OFFICE O/P EST LOW 20 MIN: CPT

## 2022-03-11 RX ORDER — SPIRONOLACTONE 25 MG/1
25 TABLET ORAL
Refills: 0 | Status: ACTIVE | COMMUNITY

## 2022-03-11 RX ORDER — CHLORHEXIDINE GLUCONATE 4 %
325 (65 FE) LIQUID (ML) TOPICAL DAILY
Qty: 30 | Refills: 0 | Status: ACTIVE | COMMUNITY
Start: 2022-03-11

## 2022-03-11 RX ORDER — BISOPROLOL FUMARATE 10 MG/1
10 TABLET, FILM COATED ORAL
Refills: 0 | Status: COMPLETED | COMMUNITY
End: 2022-03-11

## 2022-03-11 RX ORDER — BISOPROLOL FUMARATE 5 MG/1
5 TABLET, FILM COATED ORAL
Refills: 0 | Status: ACTIVE | COMMUNITY
Start: 2022-03-11

## 2022-03-11 RX ORDER — PANTOPRAZOLE SODIUM 40 MG/1
40 TABLET, DELAYED RELEASE ORAL
Refills: 0 | Status: ACTIVE | COMMUNITY

## 2022-03-22 NOTE — PHYSICAL EXAM
[Normal Breath Sounds] : Normal breath sounds [Normal Rate and Rhythm] : normal rate and rhythm [2+] : left 2+ [Ankle Swelling (On Exam)] : present [Skin Ulcer] : ulcer [Skin Induration] : induration [Alert] : alert [Oriented to Place] : oriented to place [Oriented to Time] : oriented to time [Calm] : calm [Please See PDF for [employer]] : Please See PDF for [employer]. [JVD] : no jugular venous distention  [Abdomen Tenderness] : ~T ~M No abdominal tenderness [de-identified] : Elderly AA female  [de-identified] : joe [de-identified] : non labored [FreeTextEntry1] : bilateral stasis dermatitis [de-identified] : limited ambulation

## 2022-03-22 NOTE — HISTORY OF PRESENT ILLNESS
[FreeTextEntry1] : new ulcer, draining a lot no fever \par last visit here  \par VN reports that wound of right leg has closed in1/20/21Wounds healed, dry and scabbed, just received lymphedema pump and has been using, using aquaphor for dry skin\par Moisturizes daily - additional products advised other than coca butter currently in use\par VN will measure for a new compression stocking\par Is compliant with diuretic\par no reflux on testing nl everette has pump and overload had closed last visit\par 11/20/2020New blister appeared on leg 11/16/2020. Patient states she has been unable to wear her compression stockings as she is unable to put them on by herself. \par Patient underwent evaluation for peripheral arterial disease using a [employer] diagnostic machine.  Ankle brachial index was obtained using blood pressure cuffs of the ankle and brachial segments of both legs.  A distal pulse volume recording was noted digitally on the device.  The procedure was supervised and interpreted by the physician.  EVERETTE of the right lower extremity PAD.   EVERETTE of the left lower extremity 0.87.  Waveform noted to be  biphasic.   Patient tolerated procedure well in the office.  Results discussed with the patient.\par \par \par

## 2022-03-22 NOTE — ASSESSMENT
[FreeTextEntry1] : 86 yr w with  cellulitis and overload htn afib now on xarelto\par no bleeding, moleculight neg\par The patient presents with a wound to the right lower extremity.  Swelling noted to the extremity.  \par EVERETTE/PVR: and venous reflux done and results discussed with patient.  wnl\par Edema noted\par No clinical sign of infection\par Recommendation:\par Apply lidocaine or topical anesthetic.\par Wash wound with ----0.9% saline/\par Apply ---foam/ Kerramax dynaflex extrasorbankle right and left 28.5\par Apply --- compression stocking \par Change dressing ---daily\par Leg elevation as tolerated\par Encouraged ambulation or exercise.\par Optimization of nutrition.\par Offloading to the wound site.\par 2/11/22\par Right leg venous ulcer , drainage is copios, only aquacel over, wound pink, malodorous, culture obtained, no compression on leg, has a nurse\par not using lymphedema pump\par no recent vascular studies\par measured for compression garments\par \par 3/11/22\par patient none compliant with wearing of garment to left leg or using lymphydema pump\par Emphasized on importance of compression \par Maceration noted to the periwound, zinc oxide applied\par wound excissioanlly debrided\par xtrasorb, multilayer dressing\par Nursing orders given\par Follow up in 2 weeks

## 2022-03-24 ENCOUNTER — APPOINTMENT (OUTPATIENT)
Dept: WOUND CARE | Facility: CLINIC | Age: 87
End: 2022-03-24
Payer: MEDICARE

## 2022-03-24 VITALS — TEMPERATURE: 97.2 F

## 2022-03-24 PROCEDURE — 99213 OFFICE O/P EST LOW 20 MIN: CPT

## 2022-03-25 ENCOUNTER — APPOINTMENT (OUTPATIENT)
Dept: WOUND CARE | Facility: CLINIC | Age: 87
End: 2022-03-25

## 2022-04-02 NOTE — PLAN
[FreeTextEntry1] :  \par Plan - vascular studies to be ordered\par  \par drawtex and extrasorb\par  flagyl and renew bactrimds february-\par \par needs super absorber over wound, unna or dynaflex\par use lymphedema pump daily\par script for compression garment given\par follow up 2 weeks

## 2022-04-02 NOTE — PHYSICAL EXAM
[Normal Breath Sounds] : Normal breath sounds [Normal Rate and Rhythm] : normal rate and rhythm [2+] : left 2+ [Ankle Swelling (On Exam)] : present [Skin Ulcer] : ulcer [Skin Induration] : induration [Alert] : alert [Oriented to Place] : oriented to place [Oriented to Time] : oriented to time [Calm] : calm [Please See PDF for [employer]] : Please See PDF for [employer]. [JVD] : no jugular venous distention  [Abdomen Tenderness] : ~T ~M No abdominal tenderness [de-identified] : Elderly AA female  [de-identified] : joe [de-identified] : non labored [FreeTextEntry1] : bilateral stasis dermatitis [de-identified] : limited ambulation

## 2022-04-02 NOTE — ASSESSMENT
[FreeTextEntry1] : 86 yr w with  cellulitis and overload htn afib now on xarelto\par no bleeding, moleculight neg\par The patient presents with a wound to the right lower extremity.  Swelling noted to the extremity.  \par EVERETTE/PVR: and venous reflux done and results discussed with patient.  wnl\par Edema noted no clinical sign of infection\par Recommendation:\par Apply lidocaine or topical anesthetic.\par Wash wound with ----0.9% saline/\par Apply ---foam/ Kerramax dynaflex extrasorbankle right and left 28.5\par Apply --- compression stocking \par Change dressing ---daily\par Leg elevation as tolerated\par Encouraged ambulation or exercise.\par Optimization of nutrition.\par Offloading to the wound site.\par \par not using lymphedema pump\par no recent vascular studies\par measured for compression garments\par  \par patient none compliant with wearing of garment to left leg or using lymphydema pump\par Emphasized on importance of compression \par Maceration noted to the periwound, zinc oxide applied\par wound excisional debrided\par xtrasorb, multilayer dressing\par Nursing orders given\par Follow up in 2 weeks

## 2022-04-02 NOTE — HISTORY OF PRESENT ILLNESS
[FreeTextEntry1] : new ulcer, draining a lot  no fever , using extrasorb\par  \par VN reports that wound of right leg has closed in1/20/21Wounds healed, dry and scabbed, just received lymphedema pump and has been using, using aquaphor for dry skin\par Moisturizes daily - additional products advised other than coca butter currently in use\par VN will measure for a new compression stocking\par Is compliant with diuretic\par no reflux on testing nl everette has pump and overload had closed last visit\par 11/20/2020New blister appeared on leg 11/16/2020. Patient states she has been unable to wear her compression stockings as she is unable to put them on by herself. \par Patient underwent evaluation for peripheral arterial disease using a [employer] diagnostic machine.  Ankle brachial index was obtained using blood pressure cuffs of the ankle and brachial segments of both legs.  A distal pulse volume recording was noted digitally on the device.  The procedure was supervised and interpreted by the physician.  EVERETTE of the right lower extremity PAD.   EVERETTE of the left lower extremity 0.87.  Waveform noted to be  biphasic.   Patient tolerated procedure well in the office.  Results discussed with the patient.\par 2/11/22\par Right leg venous ulcer , drainage is copious, only aquacel over, wound pink, malodorous, culture obtained, no compression on leg, has a nurse\par \par

## 2022-04-07 ENCOUNTER — APPOINTMENT (OUTPATIENT)
Dept: WOUND CARE | Facility: CLINIC | Age: 87
End: 2022-04-07
Payer: MEDICARE

## 2022-04-07 VITALS — HEIGHT: 67 IN | TEMPERATURE: 97.5 F | BODY MASS INDEX: 34.69 KG/M2 | WEIGHT: 221 LBS

## 2022-04-07 VITALS — DIASTOLIC BLOOD PRESSURE: 72 MMHG | RESPIRATION RATE: 16 BRPM | SYSTOLIC BLOOD PRESSURE: 128 MMHG | HEART RATE: 64 BPM

## 2022-04-07 DIAGNOSIS — Z87.891 PERSONAL HISTORY OF NICOTINE DEPENDENCE: ICD-10-CM

## 2022-04-07 PROCEDURE — 99212 OFFICE O/P EST SF 10 MIN: CPT

## 2022-04-07 NOTE — PHYSICAL EXAM
[2+] : left 2+ [Ankle Swelling (On Exam)] : present [Skin Ulcer] : ulcer [Skin Induration] : induration [Alert] : alert [Oriented to Place] : oriented to place [Oriented to Time] : oriented to time [Calm] : calm [Please See PDF for [employer]] : Please See PDF for [employer]. [Ankle Swelling On The Right] : of the right ankle [Ankle Swelling On The Left] : moderate [] : of the right leg [Ankle Swelling Bilaterally] : severe [Abdomen Tenderness] : ~T ~M No abdominal tenderness [de-identified] : Elderly AA female, well developed  [de-identified] : non labored [FreeTextEntry1] : no overt ischemia right leg [de-identified] : limited ambulation [de-identified] : conversant yes

## 2022-04-07 NOTE — ASSESSMENT
[FreeTextEntry1] : 86 yr w with  cellulitis and overload htn afib now on xarelto\par no bleeding, moleculight neg\par The patient presents with a wound to the right lower extremity.  Swelling noted to the extremity.  \par EVERETTE/PVR: and venous reflux done and results discussed with patient.  wnl\par Edema noted no clinical sign of infection\par Recommendation:\par Apply lidocaine or topical anesthetic.\par Wash wound with ----0.9% saline/ soap and water \par \par 4/7/22\par severe pain to open wound site \par patient stated she is using lymph edema pump has not gotten compression socks/garment as yet \par patient remove unna boot this morning applied numerous pads drainage large not ace found to right leg \par Emphasized on importance of compression \par Maceration noted to the periwound, zinc oxide applied\par wound mechanically  debrided\par xtrasorb, multilayer dressing\par Nursing orders given\par recommendations for derm evaluation to r/o PG , referred to dr Baeza\par Follow up in 2 weeks\par Arterial and Venous dupex were nl in 12/20

## 2022-04-07 NOTE — REVIEW OF SYSTEMS
[As Noted in HPI] : as noted in HPI [Skin Lesions] : skin lesion [Skin Wound] : skin wound [Negative] : Heme/Lymph [Fever] : no fever

## 2022-04-07 NOTE — HISTORY OF PRESENT ILLNESS
[FreeTextEntry1] : new ulcer, draining a lot  no fever , using extrasorb\par  \par VN reports that wound of right leg has closed in1/20/21Wounds healed, dry and scabbed, just received lymphedema pump and has been using, using aquaphor for dry skin\par Moisturizes daily - additional products advised other than coca butter currently in use\par VN will measure for a new compression stocking\par Is compliant with diuretic\par no reflux on testing nl everette has pump and overload had closed last visit\par 11/20/2020New blister appeared on leg 11/16/2020. Patient states she has been unable to wear her compression stockings as she is unable to put them on by herself. \par Patient underwent evaluation for peripheral arterial disease using a [employer] diagnostic machine.  Ankle brachial index was obtained using blood pressure cuffs of the ankle and brachial segments of both legs.  A distal pulse volume recording was noted digitally on the device.  The procedure was supervised and interpreted by the physician.  EVERETTE of the right lower extremity PAD.   EVERETTE of the left lower extremity 0.87.  Waveform noted to be  biphasic.   Patient tolerated procedure well in the office.  Results discussed with the patient.\par 2/11/22\par Right leg venous ulcer , drainage is copious, only aquacel over, wound pink, malodorous, culture obtained, no compression on leg, has a nurse\par 4/7/22 87 yo female who is Using diuretic( compliant), lymphedema pump 2 X daily, reports significant drainage drainage from right leg wound\par is not elevating ight leg during the day\par H/O rheumatoid arthritis noted, not on steroids\par

## 2022-04-07 NOTE — PLAN
[FreeTextEntry1] :  \par Plan - vascular studies to be ordered\par \par 4/7/22\par sever pain to open wound site \par patient stated she is using lymph edema pump has not gotten compression socks/garment as yet \par patient remove unna boot this morning applied numerous pads drainage large not ace found to right leg \par Emphasized on importance of compression \par Maceration noted to the periwound, zinc oxide applied\par wound excisional debrided\par xtrasorb, multilayer dressing\par Nursing orders given\par recommendations for derm evaluation to r/o PG \par Follow up in 2 weeks

## 2022-04-22 ENCOUNTER — APPOINTMENT (OUTPATIENT)
Dept: WOUND CARE | Facility: CLINIC | Age: 87
End: 2022-04-22
Payer: MEDICARE

## 2022-04-22 VITALS
HEART RATE: 84 BPM | TEMPERATURE: 97.3 F | DIASTOLIC BLOOD PRESSURE: 80 MMHG | SYSTOLIC BLOOD PRESSURE: 162 MMHG | RESPIRATION RATE: 18 BRPM

## 2022-04-22 PROCEDURE — 99213 OFFICE O/P EST LOW 20 MIN: CPT

## 2022-04-22 RX ORDER — RIVAROXABAN 15 MG/1
15 TABLET, FILM COATED ORAL
Refills: 0 | Status: DISCONTINUED | COMMUNITY
End: 2022-04-22

## 2022-04-22 RX ORDER — RIVAROXABAN 20 MG/1
20 TABLET, FILM COATED ORAL
Refills: 0 | Status: DISCONTINUED | COMMUNITY
End: 2022-04-22

## 2022-04-22 RX ORDER — SULFAMETHOXAZOLE AND TRIMETHOPRIM 800; 160 MG/1; MG/1
800-160 TABLET ORAL TWICE DAILY
Qty: 14 | Refills: 0 | Status: DISCONTINUED | COMMUNITY
Start: 2022-02-15 | End: 2022-04-22

## 2022-04-22 NOTE — HISTORY OF PRESENT ILLNESS
[FreeTextEntry1] : new ulcer, draining a lot  no fever , using extrasorb\par  \par VN reports that wound of right leg has closed in1/20/21Wounds healed, dry and scabbed, just received lymphedema pump and has been using, using aquaphor for dry skin\par Moisturizes daily - additional products advised other than coca butter currently in use\par VN will measure for a new compression stocking\par Is compliant with diuretic\par no reflux on testing nl everette has pump and overload had closed last visit\par 11/20/2020New blister appeared on leg 11/16/2020. Patient states she has been unable to wear her compression stockings as she is unable to put them on by herself. \par Patient underwent evaluation for peripheral arterial disease using a [employer] diagnostic machine.  Ankle brachial index was obtained using blood pressure cuffs of the ankle and brachial segments of both legs.  A distal pulse volume recording was noted digitally on the device.  The procedure was supervised and interpreted by the physician.  EVERETTE of the right lower extremity PAD.   EVERETTE of the left lower extremity 0.87.  Waveform noted to be  biphasic.   Patient tolerated procedure well in the office.  Results discussed with the patient.\par 2/11/22\par Right leg venous ulcer , drainage is copious, only aquacel over, wound pink, malodorous, culture obtained, no compression on leg, has a nurse\par \par

## 2022-04-22 NOTE — PHYSICAL EXAM
[Normal Breath Sounds] : Normal breath sounds [Ankle Swelling (On Exam)] : present [Skin Ulcer] : ulcer [Alert] : alert [Oriented to Place] : oriented to place [Oriented to Time] : oriented to time [Calm] : calm [Please See PDF for [employer]] : Please See PDF for [employer]. [Oriented to Person] : oriented to person [JVD] : no jugular venous distention  [Abdomen Tenderness] : ~T ~M No abdominal tenderness [de-identified] : Elderly AA female  [de-identified] : EOMI [de-identified] : supple [de-identified] : non labored [FreeTextEntry1] : bilateral stasis dermatitis [de-identified] : limited ambulation

## 2022-04-22 NOTE — ASSESSMENT
[FreeTextEntry1] : 86 yr w with  cellulitis and overload htn afib now on xarelto\par no bleeding, moleculight neg\par The patient presents with a wound to the right lower extremity.  Swelling noted to the extremity.  \par EVERETTE/PVR: and venous reflux done and results discussed with patient.  wnl\par Edema noted no clinical sign of infection\par Recommendation:\par Apply lidocaine or topical anesthetic.\par Wash wound with ----0.9% saline/\par Apply ---foam/ Kerramax dynaflex extrasorbankle right and left 28.5\par Apply --- compression stocking \par Change dressing ---daily\par Leg elevation as tolerated\par Encouraged ambulation or exercise.\par Optimization of nutrition.\par Offloading to the wound site.\par \par not using lymphedema pump\par no recent vascular studies\par measured for compression garments\par  \par patient none compliant with wearing of garment to left leg or using lymphydema pump\par Emphasized on importance of compression \par Maceration noted to the periwound, zinc oxide applied\par wound excisional debrided\par xtrasorb, multilayer dressing\par Nursing orders given\par Follow up in 2 weeks\par \par 4/22/2022:\par Pt has not made an appt with Dr. Baeza yet.\par Pt says the wound itself is very painful still\par Patient using lymphedema pump 2x's per day\par Dressing was last changed 4/20/22 by nurse with Xtrasorb and unna boot, dressing was fully saturated and skin was moist due to drainage.\par \par On exam:  No s/s of infection.  Periwound maceration.  Wound tender to palpation\par

## 2022-04-22 NOTE — PLAN
[FreeTextEntry1] : 4-22-22\par Plan-\par Dressing to be changed to Xtrasorb with juan carlos and ace and zinc oxide on periwound, so that patient can change dressing inbetween Nurse to prevent maceration of skin (ie daily dressing change).  Pt instructed on wound care.\par c/w lymphedema pump\par f/u in 2 weeks

## 2022-05-02 NOTE — PATIENT PROFILE ADULT. - TEACHING/LEARNING DEVELOPMENTAL CONSIDERATIONS
clonazePAM (KlonoPIN) 1 MG tablet Take 1 tablet by mouth daily as needed for Anxiety  Last refill: 3/31/22 #30 refill 0  Last office visit: 4/229/21    Pharmacy comment: pt is requesting 60tabs    Please advise.    none

## 2022-05-06 ENCOUNTER — APPOINTMENT (OUTPATIENT)
Dept: WOUND CARE | Facility: CLINIC | Age: 87
End: 2022-05-06
Payer: MEDICARE

## 2022-05-06 VITALS
TEMPERATURE: 95.1 F | DIASTOLIC BLOOD PRESSURE: 84 MMHG | SYSTOLIC BLOOD PRESSURE: 143 MMHG | HEART RATE: 83 BPM | RESPIRATION RATE: 20 BRPM

## 2022-05-06 PROCEDURE — 99214 OFFICE O/P EST MOD 30 MIN: CPT

## 2022-05-06 NOTE — PLAN
[FreeTextEntry1] : 4-22-22\par Plan-\par Dressing to be changed to Xtrasorb with juan carlos and ace and zinc oxide on periwound, so that patient can change dressing inbetween Nurse to prevent maceration of skin (ie daily dressing change).  Pt instructed on wound care.\par c/w lymphedema pump\par f/u in 2 weeks\par \par 5/6/2022\par Plan-\par patient instructed to use pump 2x's per day for 45 minutes\par zinc to periwound/adaptic/aquacell cut to size/superabsorber/juan carlos ACE-- 3x/ week by Nurse and pt changes it on other days\par f/u 1 week\par

## 2022-05-06 NOTE — HISTORY OF PRESENT ILLNESS
[FreeTextEntry1] : new ulcer, draining a lot  no fever , using extrasorb\par  \par VN reports that wound of right leg has closed in1/20/21Wounds healed, dry and scabbed, just received lymphedema pump and has been using, using aquaphor for dry skin\par Moisturizes daily - additional products advised other than coca butter currently in use\par VN will measure for a new compression stocking\par Is compliant with diuretic\par no reflux on testing nl everette has pump and overload had closed last visit\par 11/20/2020New blister appeared on leg 11/16/2020. Patient states she has been unable to wear her compression stockings as she is unable to put them on by herself. \par Patient underwent evaluation for peripheral arterial disease using a [employer] diagnostic machine.  Ankle brachial index was obtained using blood pressure cuffs of the ankle and brachial segments of both legs.  A distal pulse volume recording was noted digitally on the device.  The procedure was supervised and interpreted by the physician.  EVERETTE of the right lower extremity PAD.   EVERETTE of the left lower extremity 0.87.  Waveform noted to be  biphasic.   Patient tolerated procedure well in the office.  Results discussed with the patient.\par 2/11/22\par Right leg venous ulcer , drainage is copious, only aquacel over, wound pink, malodorous, culture obtained, no compression on leg, has a nurse\par \par

## 2022-05-06 NOTE — PHYSICAL EXAM
[Normal Breath Sounds] : Normal breath sounds [Ankle Swelling (On Exam)] : present [Skin Ulcer] : ulcer [Alert] : alert [Oriented to Person] : oriented to person [Oriented to Place] : oriented to place [Oriented to Time] : oriented to time [Calm] : calm [Please See PDF for [employer]] : Please See PDF for [employer]. [JVD] : no jugular venous distention  [Abdomen Tenderness] : ~T ~M No abdominal tenderness [de-identified] : Elderly AA female  [de-identified] : EOMI [de-identified] : supple [de-identified] : non labored [FreeTextEntry1] : bilateral stasis dermatitis, faint dp on left [de-identified] : limited ambulation

## 2022-05-06 NOTE — ASSESSMENT
[FreeTextEntry1] : 86 yr w with  cellulitis and overload htn afib now on xarelto\par no bleeding, moleculight neg\par The patient presents with a wound to the right lower extremity.  Swelling noted to the extremity.  \par EVERETTE/PVR: and venous reflux done and results discussed with patient.  wnl\par Edema noted no clinical sign of infection\par Recommendation:\par Apply lidocaine or topical anesthetic.\par Wash wound with ----0.9% saline/\par Apply ---foam/ Kerramax dynaflex extrasorbankle right and left 28.5\par Apply --- compression stocking \par Change dressing ---daily\par Leg elevation as tolerated\par Encouraged ambulation or exercise.\par Optimization of nutrition.\par Offloading to the wound site.\par \par not using lymphedema pump\par no recent vascular studies\par measured for compression garments\par  \par patient none compliant with wearing of garment to left leg or using lymphydema pump\par Emphasized on importance of compression \par Maceration noted to the periwound, zinc oxide applied\par wound excisional debrided\par xtrasorb, multilayer dressing\par Nursing orders given\par Follow up in 2 weeks\par \par 4/22/2022:\par Pt has not made an appt with Dr. Baeza yet.\par Pt says the wound itself is very painful still\par Patient using lymphedema pump 2x's per day\par Dressing was last changed 4/20/22 by nurse with Xtrasorb and unna boot, dressing was fully saturated and skin was moist due to drainage.\par \par On exam:  No s/s of infection.  Periwound maceration.  Wound tender to palpation\par \par 5/6/2022\par Pt has not made an appt with Dr. Baeza yet.  Needs to arrange transport\par Skin maceration present, skin is peeling like a film, Patient changing bandage between Nurse visits, still having extremely large drainage.\par Patient dressing wound with Xtrasorb, juan carlos, and ace daily and still weeping\par Patient states she is using the lymphedema pump 2x's per day, but only for 20 minutes\par On exam, mild maceration.  wound bed red.  No s/s of infection.  Mild tenderness to the wound bed.\par \par

## 2022-05-13 ENCOUNTER — APPOINTMENT (OUTPATIENT)
Dept: WOUND CARE | Facility: CLINIC | Age: 87
End: 2022-05-13
Payer: MEDICARE

## 2022-05-13 VITALS
RESPIRATION RATE: 16 BRPM | DIASTOLIC BLOOD PRESSURE: 88 MMHG | WEIGHT: 224 LBS | TEMPERATURE: 97.3 F | BODY MASS INDEX: 35.08 KG/M2 | SYSTOLIC BLOOD PRESSURE: 129 MMHG | HEART RATE: 65 BPM

## 2022-05-13 PROCEDURE — 99215 OFFICE O/P EST HI 40 MIN: CPT

## 2022-05-13 RX ORDER — SPIRONOLACTONE 25 MG/1
25 TABLET ORAL
Refills: 0 | Status: COMPLETED | COMMUNITY
End: 2022-05-13

## 2022-05-13 NOTE — HISTORY OF PRESENT ILLNESS
[FreeTextEntry1] : new ulcer, draining a lot  no fever , using extrasorb\par  \par VN reports that wound of right leg has closed in1/20/21Wounds healed, dry and scabbed, just received lymphedema pump and has been using, using aquaphor for dry skin\par Moisturizes daily - additional products advised other than coca butter currently in use\par VN will measure for a new compression stocking\par Is compliant with diuretic\par no reflux on testing nl everette has pump and overload had closed last visit\par 11/20/2020New blister appeared on leg 11/16/2020. Patient states she has been unable to wear her compression stockings as she is unable to put them on by herself. \par Patient underwent evaluation for peripheral arterial disease using a [employer] diagnostic machine.  Ankle brachial index was obtained using blood pressure cuffs of the ankle and brachial segments of both legs.  A distal pulse volume recording was noted digitally on the device.  The procedure was supervised and interpreted by the physician.  EVERETTE of the right lower extremity PAD.   EVERETTE of the left lower extremity 0.87.  Waveform noted to be  biphasic.   Patient tolerated procedure well in the office.  Results discussed with the patient.\par 2/11/22\par Right leg venous ulcer , drainage is copious, only aquacel over, wound pink, malodorous, culture obtained, no compression on leg, has a nurse\par \par

## 2022-05-13 NOTE — PHYSICAL EXAM
[Normal Breath Sounds] : Normal breath sounds [Ankle Swelling (On Exam)] : present [Skin Ulcer] : ulcer [Alert] : alert [Oriented to Person] : oriented to person [Oriented to Place] : oriented to place [Oriented to Time] : oriented to time [Calm] : calm [Please See PDF for [employer]] : Please See PDF for [employer]. [JVD] : no jugular venous distention  [Abdomen Tenderness] : ~T ~M No abdominal tenderness [de-identified] : Elderly AA female  [de-identified] : EOMI [de-identified] : supple [de-identified] : non labored [de-identified] : limited ambulation [FreeTextEntry1] : bilateral stasis dermatitis, faint dp on left

## 2022-05-13 NOTE — PLAN
[FreeTextEntry1] : 4-22-22\par Plan-\par Dressing to be changed to Xtrasorb with juan carlos and ace and zinc oxide on periwound, so that patient can change dressing inbetween Nurse to prevent maceration of skin (ie daily dressing change).  Pt instructed on wound care.\par c/w lymphedema pump\par f/u in 2 weeks\par \par 5/6/2022\par Plan-\par patient instructed to use pump 2x's per day for 45 minutes\par zinc to periwound/adaptic/aquacell cut to size/superabsorber/juan carlos ACE-- 3x/ week by Nurse and pt changes it on other days\par f/u 1 week\par \par 5-13-22:\par Plan:\par F/u Cx\par Start augmentin x 7 days (rx ordered)\par Line drawn around erythema.  Pt instructed that if no improvement in erythema (receeding) by tomorrow or there is increased pain or odor from the wound  then  she needs to go to the hospital.  Pt expressed understanding\par barrier, super absorber kerlex ace QD\par f/u 1 week

## 2022-05-13 NOTE — ASSESSMENT
[FreeTextEntry1] : 86 yr w with  cellulitis and overload htn afib now on xarelto\par no bleeding, moleculight neg\par The patient presents with a wound to the right lower extremity.  Swelling noted to the extremity.  \par EVERETTE/PVR: and venous reflux done and results discussed with patient.  wnl\par Edema noted no clinical sign of infection\par Recommendation:\par Apply lidocaine or topical anesthetic.\par Wash wound with ----0.9% saline/\par Apply ---foam/ Kerramax dynaflex extrasorbankle right and left 28.5\par Apply --- compression stocking \par Change dressing ---daily\par Leg elevation as tolerated\par Encouraged ambulation or exercise.\par Optimization of nutrition.\par Offloading to the wound site.\par not using lymphedema pump\par no recent vascular studies\par measured for compression garments\par  patient none compliant with wearing of garment to left leg or using lymphydema pump\par Emphasized on importance of compression \par Maceration noted to the periwound, zinc oxide applied\par wound excisional debrided\par xtrasorb, multilayer dressing\par Nursing orders given\par Follow up in 2 weeks\par \par 4/22/2022:\par Pt has not made an appt with Dr. Baeza yet.\par Pt says the wound itself is very painful still\par Patient using lymphedema pump 2x's per day\par Dressing was last changed 4/20/22 by nurse with Xtrasorb and unna boot, dressing was fully saturated and skin was moist due to drainage.\par \par On exam:  No s/s of infection.  Periwound maceration.  Wound tender to palpation\par 5/6/2022\par Pt has not made an appt with Dr. Baeza yet.  Needs to arrange transport\par Skin maceration present, skin is peeling like a film, Patient changing bandage between Nurse visits, still having extremely large drainage.\par Patient dressing wound with Xtrasorb, juan carlos, and ace daily and still weeping\par Patient states she is using the lymphedema pump 2x's per day, but only for 20 minutes\par On exam, mild maceration.  wound bed red.  No s/s of infection.  Mild tenderness to the wound bed.\par \par 5/13/222\par RLE erythema and warmth up to knee.  Pt says started yesterday.  \par Pt still using pump 2x/day 20min each\par wounds still draining copious amounts\par On exam, leg with slightly more edema.  Warm and erythematous.  Wounds clean with red wound bed but tender on exam.  Cx taken.  No iodor fluctuance or crepitus\par \par \par

## 2022-05-17 ENCOUNTER — NON-APPOINTMENT (OUTPATIENT)
Age: 87
End: 2022-05-17

## 2022-05-17 LAB — BACTERIA SPEC CULT: ABNORMAL

## 2022-05-19 ENCOUNTER — APPOINTMENT (OUTPATIENT)
Dept: WOUND CARE | Facility: CLINIC | Age: 87
End: 2022-05-19
Payer: MEDICARE

## 2022-05-19 VITALS — SYSTOLIC BLOOD PRESSURE: 129 MMHG | TEMPERATURE: 97.7 F | HEART RATE: 77 BPM | DIASTOLIC BLOOD PRESSURE: 81 MMHG

## 2022-05-19 PROCEDURE — 99213 OFFICE O/P EST LOW 20 MIN: CPT

## 2022-05-19 RX ORDER — TORSEMIDE 20 MG/1
20 TABLET ORAL
Refills: 0 | Status: COMPLETED | COMMUNITY
End: 2022-05-19

## 2022-05-23 NOTE — DISCUSSION/SUMMARY
[FreeTextEntry1] : Called pt at approx 2:50 PM [phone].. As per pt her leg is looking and feeling better.   D/w pt te culture results and that she is on abx which the bacteria are susceptible to.  Also d/w pt that the Rx was written for the wrong amount of abx and that she should only take the abx for 7 days total..  Pt expressed understanding.  Pt has an appt with Dr. Ordaz on 5-17-22.

## 2022-05-23 NOTE — PLAN
[FreeTextEntry1] :  \par  \par Plan-\par patient instructed to use pump 2x's per day for 45 minutes\par zinc to periwound/adaptic/aquacell cut to size/superabsorber/juan carlos ACE-- 3x/ week by Nurse and pt changes it on other days\par f/u 1 week   augmentin x 7 days (rx ordered) for psuedomonas aeringinoas/ enterococus\par barrier, super absorber kerlex ace QD\par f/u 1 week

## 2022-05-23 NOTE — ASSESSMENT
[FreeTextEntry1] : 86 yr w with  cellulitis and overload htn afib now on xarelto\par no bleeding, moleculight neg/ wound to the right lower extremity. \par  Swelling noted to the extremity.  \par EVERETTE/PVR: and venous reflux done and results discussed with patient.  wnl\par Edema noted no clinical sign of infection\par Recommendation:\par Apply lidocaine or topical anesthetic.\par Wash wound with ----0.9% saline/\par Apply ---foam/ Kerramax dynaflex extrasorbankle right and left 28.5\par Apply --- compression stocking \par Change dressing ---daily\par Leg elevation as tolerated\par Encouraged ambulation or exercise.\par Optimization of nutrition.\par Offloading to the wound site.\par not using lymphedema pump\par no recent vascular studies\par measured for compression garments\par  patient none compliant with wearing of garment to left leg or using lymphydema pump\par Emphasized on importance of compression \par Maceration noted to the periwound, zinc oxide applied\par wound excisional debrided\par xtrasorb, multilayer dressing\par Nursing orders given\par Follow up in 2 weeks \par Patient using lymphedema pump 2x's per day\par   by nurse with Xtrasorb and unna boot, dressing was fully saturated\par  and skin was moist due to drainage.\par \par  \par RLE erythema and warmth up to knee.  Pt says started yesterday.  \par Pt still using pump 2x/day 20min each\par wounds still draining copious amounts\par On exam, leg with slightly more edema.  Warm and erythematous.\par   Wounds clean with red wound bed but tender on exam.  Cx taken.  \par No iodor fluctuance or crepitus\par \par \par

## 2022-05-23 NOTE — PHYSICAL EXAM
[JVD] : no jugular venous distention  [Normal Breath Sounds] : Normal breath sounds [Ankle Swelling (On Exam)] : present [Abdomen Tenderness] : ~T ~M No abdominal tenderness [Skin Ulcer] : ulcer [Alert] : alert [Oriented to Person] : oriented to person [Oriented to Place] : oriented to place [Oriented to Time] : oriented to time [Calm] : calm [de-identified] : Elderly AA female  [de-identified] : EOMI [de-identified] : supple [de-identified] : non labored [FreeTextEntry1] : bilateral stasis dermatitis [de-identified] : limited ambulation [Please See PDF for [employer]] : Please See PDF for [employer].

## 2022-05-23 NOTE — HISTORY OF PRESENT ILLNESS
[FreeTextEntry1] : new ulcer, draining a lot  no fever , using extrasorb\par  \par VN reports that wound of right leg has closed in1/20/21Wounds healed, dry and scabbed, just received lymphedema pump and has been using, using aquaphor for dry skin\par Moisturizes daily - additional products advised other than coca butter currently in use\par VN will measure for a new compression stocking\par Is compliant with diuretic\par no reflux on testing nl everette has pump and overload had closed last visit\par 11/20/2020New blister appeared on leg 11/16/2020. Patient states she has been unable to wear her compression stockings as she is unable to put them on by herself. \par Patient underwent evaluation for peripheral arterial disease using a [employer] diagnostic machine.  Ankle brachial index was obtained using blood pressure cuffs of the ankle and brachial segments of both legs.  A distal pulse volume recording was noted digitally on the device.  The procedure was supervised and interpreted by the physician.  EVERETTE of the right lower extremity PAD.   EVERETTE of the left lower extremity 0.87.  Waveform noted to be  biphasic.   Patient tolerated procedure well in the office.  Results discussed with the patient.\par 2/11/22\par Right leg venous ulcer , drainage is copious, only aquacel over, wound pink, malodorous, culture obtained, no compression on leg, has a nurse\par \par

## 2022-06-07 ENCOUNTER — APPOINTMENT (OUTPATIENT)
Dept: WOUND CARE | Facility: CLINIC | Age: 87
End: 2022-06-07
Payer: MEDICARE

## 2022-06-07 VITALS — HEART RATE: 60 BPM | RESPIRATION RATE: 18 BRPM | DIASTOLIC BLOOD PRESSURE: 75 MMHG | SYSTOLIC BLOOD PRESSURE: 127 MMHG

## 2022-06-07 VITALS — TEMPERATURE: 97.2 F

## 2022-06-07 PROCEDURE — 11042 DBRDMT SUBQ TIS 1ST 20SQCM/<: CPT

## 2022-06-07 PROCEDURE — 99213 OFFICE O/P EST LOW 20 MIN: CPT | Mod: 25

## 2022-06-07 NOTE — DISCUSSION/SUMMARY
[FreeTextEntry1] : Called pt at approx 2:50 PM [phone].. As per pt her leg is looking and feeling better.   D/w pt te culture results and that she is on abx which the bacteria are susceptible to.  Also d/w pt that the Rx was written for the wrong amount of abx and that she should only take the abx for 7 days total..  Pt expressed understanding.  Pt has an appt with Dr. Ordaz on 5-17-22.

## 2022-06-07 NOTE — PHYSICAL EXAM
[Normal Breath Sounds] : Normal breath sounds [Ankle Swelling (On Exam)] : present [Skin Ulcer] : ulcer [Alert] : alert [Oriented to Person] : oriented to person [Oriented to Place] : oriented to place [Oriented to Time] : oriented to time [Calm] : calm [Please See PDF for [employer]] : Please See PDF for [employer]. [JVD] : no jugular venous distention  [Abdomen Tenderness] : ~T ~M No abdominal tenderness [de-identified] : Elderly AA female  [de-identified] : EOMI [de-identified] : supple [de-identified] : non labored [FreeTextEntry1] : bilateral stasis dermatitis [de-identified] : limited ambulation

## 2022-06-07 NOTE — ASSESSMENT
[FreeTextEntry1] : 86 yr w with  cellulitis and overload htn afib now on xarelto\par no bleeding,   wound to the right lower extremity. smaller\par \par  Swelling noted to the extremity.  less than previous\par EVERETTE/PVR: and venous reflux done and results discussed with patient.  wnl\par Edema noted no clinical sign of infection\par Recommendation:\par Apply lidocaine or topical anesthetic.\par Wash wound with ----0.9% saline/\par Apply ---foam/ Kerramax dynaflex extrasorbankle right and left 28.5\par Apply --- compression stocking \par Change dressing ---daily\par Leg elevation as tolerated\par Encouraged ambulation or exercise.\par Optimization of nutrition.\par Offloading to the wound site.\par not using lymphedema pump\par no recent vascular studies\par measured for compression garments\par  patient none compliant with wearing of garment to left leg or using lymphydema pump\par Emphasized on importance of compression \par Maceration noted to the periwound, zinc oxide applied\par wound excisional debrided\par xtrasorb, multilayer dressing\par Nursing orders given\par Follow up in 2 weeks \par Patient using lymphedema pump 2x's per day\par   by nurse with Xtrasorb and unna boot, dressing was fully saturated\par  and skin was moist due to drainage.\par \par  \par RLE erythema and warmth up to knee.  Pt says started yesterday.  \par Pt still using pump 2x/day 20min each\par wounds still draining copious amounts\par On exam, leg with slightly more edema.  Warm and erythematous.\par   Wounds clean with red wound bed but tender on exam.  Cx taken.  \par No iodor fluctuance or crepitus\par \par \par

## 2022-06-07 NOTE — HISTORY OF PRESENT ILLNESS
[FreeTextEntry1] : new ulcer, draining a lot  no fever ,\par  using extrasorb some progress\par  \par VN reports that wound of right leg has closed in1/20/21Wounds healed, dry and scabbed, just received lymphedema pump and has been using, using aquaphor for dry skin\par Moisturizes daily - additional products advised other than coca butter currently in use\par VN will measure for a new compression stocking\par Is compliant with diuretic\par no reflux on testing nl everette has pump and overload had closed last visit\par 11/20/2020New blister appeared on leg 11/16/2020. Patient states she has been unable to wear her compression stockings as she is unable to put them on by herself. \par Patient underwent evaluation for peripheral arterial disease using a [employer] diagnostic machine.  Ankle brachial index was obtained using blood pressure cuffs of the ankle and brachial segments of both legs.  A distal pulse volume recording was noted digitally on the device.  The procedure was supervised and interpreted by the physician.  EVERETTE of the right lower extremity PAD.   EVERETTE of the left lower extremity 0.87.  Waveform noted to be  biphasic.   Patient tolerated procedure well in the office.  Results discussed with the patient.\par 2/11/22\par Right leg venous ulcer , drainage is copious, only aquacel over, wound pink, malodorous, culture obtained, no compression on leg, has a nurse\par \par

## 2022-07-05 ENCOUNTER — APPOINTMENT (OUTPATIENT)
Dept: WOUND CARE | Facility: CLINIC | Age: 87
End: 2022-07-05

## 2022-07-05 VITALS — HEART RATE: 83 BPM | SYSTOLIC BLOOD PRESSURE: 147 MMHG | DIASTOLIC BLOOD PRESSURE: 85 MMHG | TEMPERATURE: 97.9 F

## 2022-07-05 DIAGNOSIS — L03.90 CELLULITIS, UNSPECIFIED: ICD-10-CM

## 2022-07-05 PROCEDURE — 99213 OFFICE O/P EST LOW 20 MIN: CPT

## 2022-07-05 RX ORDER — AMMONIUM LACTATE 12 %
12 CREAM (GRAM) TOPICAL TWICE DAILY
Qty: 1 | Refills: 2 | Status: ACTIVE | COMMUNITY
Start: 2022-07-05 | End: 1900-01-01

## 2022-07-11 PROBLEM — L03.90 CELLULITIS: Status: ACTIVE | Noted: 2022-05-13

## 2022-07-11 NOTE — HISTORY OF PRESENT ILLNESS
[FreeTextEntry1] : new ulcer, draining a lot  no fever ,\par  using extrasorb some progress\par  \par VN reports that wound of right leg has closed in1/20/21Wounds healed, dry and scabbed, just received lymphedema pump and has been using, using aquaphor for dry skin\par Moisturizes daily - additional products advised other than coca butter currently in use\par VN will measure for a new compression stocking\par Is compliant with diuretic\par no reflux on testing nl everette has pump and overload had closed last visit\par 11/20/2020New blister appeared on leg 11/16/2020. Patient states she has been unable to wear her compression stockings as she is unable to put them on by herself. \par Patient underwent evaluation for peripheral arterial disease using a [employer] diagnostic machine.  Ankle brachial index was obtained using blood pressure cuffs of the ankle and brachial segments of both legs.  A distal pulse volume recording was noted digitally on the device.  The procedure was supervised and interpreted by the physician.  EVERETTE of the right lower extremity PAD.   EVERETTE of the left lower extremity 0.87.  Waveform noted to be  biphasic.   Patient tolerated procedure well in the office.  Results discussed with the patient.\par 2/11/22\par Right leg venous ulcer , drainage is copious, only aquacel over, wound pink, malodorous, culture obtained, no compression on leg, has a nurse\par \par

## 2022-07-11 NOTE — PLAN
[FreeTextEntry1] :  7/5/22\par Plan - c/w adaptic/aquacel/unna\par use pump 2x/day\par stop using a&d ointment, lac hydrin to dry skin on legs, order placed\par orders for nurse\par follow up 2-3 weeks

## 2022-07-11 NOTE — ASSESSMENT
[FreeTextEntry1] : 86 yr w with  cellulitis and overload htn afib now on xarelto\par no bleeding,   wound to the right lower extremity. smaller\par \par  Swelling noted to the extremity.  less than previous\par EVERETTE/PVR: and venous reflux done and results discussed with patient.  wnl\par Edema noted no clinical sign of infection\par Recommendation:\par Apply lidocaine or topical anesthetic.\par Wash wound with ----0.9% saline/\par Apply ---foam/ Kerramax dynaflex extrasorbankle right and left 28.5\par Apply --- compression stocking \par Change dressing ---daily\par Leg elevation as tolerated\par Encouraged ambulation or exercise.\par Optimization of nutrition.\par Offloading to the wound site.\par not using lymphedema pump\par no recent vascular studies\par measured for compression garments\par  patient none compliant with wearing of garment to left leg or using lymphydema pump\par Emphasized on importance of compression \par Maceration noted to the periwound, zinc oxide applied\par wound excisional debrided\par xtrasorb, multilayer dressing\par Nursing orders given\par Follow up in 2 weeks \par Patient using lymphedema pump 2x's per day\par   by nurse with Xtrasorb and unna boot, dressing was fully saturated\par  and skin was moist due to drainage.\par \par  \par RLE erythema and warmth up to knee.  Pt says started yesterday.  \par Pt still using pump 2x/day 20min each\par wounds still draining copious amounts\par On exam, leg with slightly more edema.  Warm and erythematous.\par   Wounds clean with red wound bed but tender on exam.  Cx taken.  \par No iodor fluctuance or crepitus\par 7/5/22\par right calf wounds drying up, less drainage using pump\par only mechanical debridement today\par applying A& D ointment to legs to moisturize\par \par \par

## 2022-07-11 NOTE — DISCUSSION/SUMMARY
[FreeTextEntry1] : Called pt at approx 2:50 PM [phone].. As per pt her leg is looking and feeling better.   D/w pt te culture results and that she is on abx which the bacteria are susceptible to.  Also d/w pt that the Rx was written for the wrong amount of abx and that she should only take the abx for 7 days total..  Pt expressed understanding.  Pt has an appt with Dr. Ordaz on 5-17-22.

## 2022-07-11 NOTE — PHYSICAL EXAM
[Normal Breath Sounds] : Normal breath sounds [Ankle Swelling (On Exam)] : present [Skin Ulcer] : ulcer [Alert] : alert [Oriented to Person] : oriented to person [Oriented to Place] : oriented to place [Oriented to Time] : oriented to time [Calm] : calm [Please See PDF for [employer]] : Please See PDF for [employer]. [JVD] : no jugular venous distention  [Abdomen Tenderness] : ~T ~M No abdominal tenderness [de-identified] : Elderly AA female  [de-identified] : EOMI [de-identified] : supple [de-identified] : non labored [FreeTextEntry1] : bilateral stasis dermatitis [de-identified] : limited ambulation

## 2022-07-26 ENCOUNTER — APPOINTMENT (OUTPATIENT)
Dept: WOUND CARE | Facility: CLINIC | Age: 87
End: 2022-07-26

## 2022-07-26 VITALS — DIASTOLIC BLOOD PRESSURE: 82 MMHG | RESPIRATION RATE: 18 BRPM | SYSTOLIC BLOOD PRESSURE: 144 MMHG | HEART RATE: 77 BPM

## 2022-07-26 VITALS — TEMPERATURE: 97.6 F

## 2022-07-26 PROCEDURE — 99213 OFFICE O/P EST LOW 20 MIN: CPT

## 2022-08-02 NOTE — PHYSICAL EXAM
[JVD] : no jugular venous distention  [Normal Breath Sounds] : Normal breath sounds [Ankle Swelling (On Exam)] : present [Abdomen Tenderness] : ~T ~M No abdominal tenderness [Skin Ulcer] : ulcer [Alert] : alert [Oriented to Person] : oriented to person [Oriented to Place] : oriented to place [Oriented to Time] : oriented to time [Calm] : calm [de-identified] : Elderly AA female  [de-identified] : EOMI [de-identified] : supple [de-identified] : non labored [FreeTextEntry1] : bilateral stasis dermatitis [de-identified] : limited ambulation [Please See PDF for [employer]] : Please See PDF for [employer].

## 2022-08-02 NOTE — ASSESSMENT
[FreeTextEntry1] : 86 yr w with  cellulitis and overload htn afib now on xarelto\par no bleeding,   wound to the right lower extremity. smaller\par lymphedema pump \par extrasorb\par  Swelling noted to the extremity.  less than previous\par EVERETTE/PVR: and venous reflux done and results discussed with patient.  wnl\par Edema noted no clinical sign of infection\par Recommendation:\par Apply lidocaine or topical anesthetic.\par Wash wound with ----0.9% saline/\par Apply ---foam/ Kerramax dynaflex extrasorbankle right and left 28.5\par Apply --- compression stocking \par Change dressing ---daily\par Leg elevation as tolerated\par Encouraged ambulation or exercise.\par Optimization of nutrition.\par Offloading to the wound site.\par not using lymphedema pump\par no recent vascular studies\par measured for compression garments\par  patient none compliant with wearing of garment to left leg or using lymphydema pump\par Emphasized on importance of compression \par Maceration noted to the periwound, zinc oxide applied\par wound excisional debrided\par xtrasorb, multilayer dressing\par Nursing orders given\par Follow up in 2 weeks \par Patient using lymphedema pump 2x's per day\par   by nurse with Xtrasorb and unna boot, dressing was fully saturated\par  and skin was moist due to drainage.\par \par  \par RLE erythema and warmth up to knee.  Pt says started yesterday.  \par Pt still using pump 2x/day 20min each\par wounds still draining copious amounts\par On exam, leg with slightly more edema.  Warm and erythematous.\par   Wounds clean with red wound bed but tender on exam.  Cx taken.  \par No iodor fluctuance or crepitus\par 7/5/22\par right calf wounds drying up, less drainage using pump\par only mechanical debridement today\par applying A& D ointment to legs to moisturize\par \par \par

## 2022-08-02 NOTE — HISTORY OF PRESENT ILLNESS
[FreeTextEntry1] : new ulcer, draining a lot  no fever ,\par  using extrasorb some progress\par  \par VN reports that wound of right leg has closed in1/20/21Wounds healed, dry and scabbed, just received lymphedema pump and has been using, using aquaphor for dry skin\par Moisturizes daily - additional products advised other than coca butter currently in use\par VN will measure for a new compression stocking\par Is compliant with diuretic\par no reflux on testing nl everette has pump and overload had closed last visit\par 11/20/2020New blister appeared on leg 11/16/2020. Patient states she has been unable to wear her compression stockings as she is unable to put them on by herself. \par Patient underwent evaluation for peripheral arterial disease using a [employer] diagnostic machine.  Ankle brachial index was obtained using blood pressure cuffs of the ankle and brachial segments of both legs.  A distal pulse volume recording was noted digitally on the device.  The procedure was supervised and interpreted by the physician.  EVERETTE of the right lower extremity PAD.   EVERETTE of the left lower extremity 0.87.  Waveform noted to be  biphasic.   Patient tolerated procedure well in the office.  Results discussed with the patient.\par 2/11/22\par Right leg venous ulcer , drainage is copious, only aquacel over, wound pink, malodorous, culture obtained, no compression on leg, has a nurse\par \par

## 2022-08-25 ENCOUNTER — APPOINTMENT (OUTPATIENT)
Dept: WOUND CARE | Facility: CLINIC | Age: 87
End: 2022-08-25

## 2022-08-25 VITALS
DIASTOLIC BLOOD PRESSURE: 90 MMHG | BODY MASS INDEX: 34.53 KG/M2 | SYSTOLIC BLOOD PRESSURE: 154 MMHG | HEART RATE: 83 BPM | TEMPERATURE: 97.6 F | WEIGHT: 220 LBS | HEIGHT: 67 IN

## 2022-08-25 VITALS — WEIGHT: 222 LBS | BODY MASS INDEX: 34.77 KG/M2

## 2022-08-25 PROCEDURE — 11042 DBRDMT SUBQ TIS 1ST 20SQCM/<: CPT

## 2022-08-25 PROCEDURE — 99213 OFFICE O/P EST LOW 20 MIN: CPT | Mod: 25

## 2022-09-16 NOTE — PLAN
[FreeTextEntry1] : 8/25/2022\par Plan-\par Orders given for Nurse\par c/w super absorber and unna boot 3x's per week\par Compression Garments ordered.

## 2022-09-16 NOTE — ASSESSMENT
[FreeTextEntry1] : 86 yr w with  cellulitis and overload htn afib now on xarelto\par no bleeding,   wound to the right lower extremity. smaller\par lymphedema pump \par on antibiotics\par \par extrasorb/  Swelling noted to the extremity.  less than previous\par EVERETTE/PVR: and venous reflux done and results discussed with patient.  wnl\par Edema noted no clinical sign of infection\par Recommendation:\par Apply lidocaine or topical anesthetic.\par Wash wound with ----0.9% saline/\par Apply ---foam/ Kerramax dynaflex extrasorbankle right and left 28.5\par Apply --- compression stocking \par Change dressing ---daily\par Leg elevation as tolerated\par Encouraged ambulation or exercise.\par Optimization of nutrition.\par Offloading to the wound site.\par not using lymphedema pump\par no recent vascular studies\par measured for compression garments\par  patient none compliant with wearing of garment to left leg or using lymphydema pump\par Emphasized on importance of compression \par Maceration noted to the periwound, zinc oxide applied\par wound excisional debrided\par xtrasorb, multilayer dressing\par Nursing orders given\par Follow up in 2 weeks \par Patient using lymphedema pump 2x's per day\par   by nurse with Xtrasorb and unna boot, dressing was fully saturated\par  and skin was moist due to drainage.\par \par  \par RLE erythema and warmth up to knee.  Pt says started yesterday.  \par Pt still using pump 2x/day 20min each\par wounds still draining copious amounts\par On exam, leg with slightly more edema.  Warm and erythematous.\par   Wounds clean with red wound bed but tender on exam.  Cx taken.  \par No iodor fluctuance or crepitus\par 7/5/22\par right calf wounds drying up, less drainage using pump\par only mechanical debridement today\par applying A& D ointment to legs to moisturize\par \par \par

## 2022-09-16 NOTE — HISTORY OF PRESENT ILLNESS
[FreeTextEntry1] : new ulcer, draining a lot  no fever ,\par  using extrasorb some progress\par  \par VN reports that wound of right leg has closed in1/20/21Wounds healed, dry and scabbed, just received lymphedema pump and has been using, using aquaphor for dry skin\par Moisturizes daily - additional products advised other than coca butter currently in use\par VN will measure for a new compression stocking\par Is compliant with diuretic\par no reflux on testing nl everette has pump and overload had closed last visit\par 11/20/2020New blister appeared on leg 11/16/2020. Patient states she has been unable to wear her compression stockings as she is unable to put them on by herself. \par Patient underwent evaluation for peripheral arterial disease using a [employer] diagnostic machine.  Ankle brachial index was obtained using blood pressure cuffs of the ankle and brachial segments of both legs.  A distal pulse volume recording was noted digitally on the device.  The procedure was supervised and interpreted by the physician.  EVERETTE of the right lower extremity PAD.   EVERETTE of the left lower extremity 0.87.  Waveform noted to be  biphasic.   Patient tolerated procedure well in the office.  Results discussed with the patient.\par 2/11/22\par Right leg venous ulcer , drainage is copious, only aquacel over, wound pink, malodorous, culture obtained, no compression on leg, has a nurse\par \par

## 2022-09-16 NOTE — PHYSICAL EXAM
[Normal Breath Sounds] : Normal breath sounds [Ankle Swelling (On Exam)] : present [Skin Ulcer] : ulcer [Alert] : alert [Oriented to Person] : oriented to person [Oriented to Place] : oriented to place [Oriented to Time] : oriented to time [Calm] : calm [Please See PDF for [employer]] : Please See PDF for [employer]. [JVD] : no jugular venous distention  [Abdomen Tenderness] : ~T ~M No abdominal tenderness [de-identified] : Elderly AA female  [de-identified] : EOMI [de-identified] : supple [de-identified] : non labored [FreeTextEntry1] : bilateral stasis dermatitis [de-identified] : limited ambulation

## 2022-09-29 ENCOUNTER — APPOINTMENT (OUTPATIENT)
Dept: WOUND CARE | Facility: CLINIC | Age: 87
End: 2022-09-29

## 2022-09-29 VITALS — WEIGHT: 222 LBS | TEMPERATURE: 97.9 F | BODY MASS INDEX: 34.84 KG/M2 | HEIGHT: 67 IN

## 2022-09-29 DIAGNOSIS — M79.89 OTHER SPECIFIED SOFT TISSUE DISORDERS: ICD-10-CM

## 2022-09-29 PROCEDURE — 99213 OFFICE O/P EST LOW 20 MIN: CPT

## 2022-09-29 NOTE — PLAN
[FreeTextEntry1] : 09/29/2022\par Plan-\par Orders given for Nurse\par c/w super absorber and unna boot 3x's per week\par Compression Garments ordered.\par Venous studies ordered

## 2022-09-29 NOTE — ASSESSMENT
[FreeTextEntry1] : 86 yr w with  cellulitis and overload htn afib now on xarelto\par no bleeding,   wound to the right lower extremity. smaller\par lymphedema pump \par on antibiotics\par \par extrasorb/  Swelling noted to the extremity.  less than previous\par EVERETTE/PVR: and venous reflux done and results discussed with patient.  wnl\par Edema noted no clinical sign of infection\par Recommendation:\par Apply lidocaine or topical anesthetic.\par Wash wound with ----0.9% saline/\par Apply ---foam/ Kerramax dynaflex extrasorbankle right and left 28.5\par Apply --- compression stocking \par Change dressing ---daily\par Leg elevation as tolerated\par Encouraged ambulation or exercise.\par Optimization of nutrition.\par Offloading to the wound site.\par not using lymphedema pump\par no recent vascular studies\par measured for compression garments\par 9/29/22  Referral again given for CS\par Will repeat vascular studies last done 2 yrs ago\par  patient none compliant with wearing of garment to left leg or using lymphydema pump\par Emphasized on importance of compression \par Maceration noted to the periwound, zinc oxide applied\par wound excisional debrided\par xtrasorb, multilayer dressing\par Nursing orders given\par Follow up in 2 weeks \par Patient using lymphedema pump 2x's per day\par   by nurse with Xtrasorb and unna boot, dressing was fully saturated\par  and skin was moist due to drainage.\par \par  \par RLE erythema and warmth up to knee.  Pt says started yesterday.  \par Pt still using pump 2x/day 20min each\par wounds still draining copious amounts\par On exam, leg with slightly more edema.  Warm and erythematous.\par   Wounds clean with red wound bed but tender on exam.  Cx taken.  \par No iodor fluctuance or crepitus\par 7/5/22\par right calf wounds drying up, less drainage using pump\par only mechanical debridement today\par applying A& D ointment to legs to moisturize\par \par 09/29/2022\par right calf wounds drying up, less drainage using pump\par only mechanical debridement today\par On exam, leg with slightly more edema.  Warm and erythematous.\par Xtrasorb, dynaflex\par Venous studies ordered\par Nursing orders given\par Follow up in 2 weeks\par \par \par \par \par

## 2022-09-29 NOTE — HISTORY OF PRESENT ILLNESS
[FreeTextEntry1] : new ulcer, draining a lot  no fever ,\par  using extrasorb some progress\par  \par VN reports that wound of right leg has closed in1/20/21Wounds healed, dry and scabbed, just received lymphedema pump and has been using, using aquaphor for dry skin\par Moisturizes daily - additional products advised other than coca butter currently in use\par VN will measure for a new compression stocking\par Is compliant with diuretic\par no reflux on testing nl everette has pump and overload had closed last visit\par 11/20/2020New blister appeared on leg 11/16/2020. Patient states she has been unable to wear her compression stockings as she is unable to put them on by herself. \par Patient underwent evaluation for peripheral arterial disease using a [employer] diagnostic machine.  Ankle brachial index was obtained using blood pressure cuffs of the ankle and brachial segments of both legs.  A distal pulse volume recording was noted digitally on the device.  The procedure was supervised and interpreted by the physician.  EVERETTE of the right lower extremity PAD.   EVERETTE of the left lower extremity 0.87.  Waveform noted to be  biphasic.   Patient tolerated procedure well in the office.  Results discussed with the patient.\par 2/11/22\par Right leg venous ulcer , drainage is copious, only aquacel over, wound pink, malodorous, culture obtained, no compression on leg, has a nurse\par 9/29/22 Did not obtain CS, referral again given for Derm eval\par \par

## 2022-09-29 NOTE — PHYSICAL EXAM
[Normal Breath Sounds] : Normal breath sounds [Ankle Swelling (On Exam)] : present [Skin Ulcer] : ulcer [Alert] : alert [Oriented to Person] : oriented to person [Oriented to Place] : oriented to place [Oriented to Time] : oriented to time [Calm] : calm [Please See PDF for [employer] Analytics] : Please See PDF for [employer]. [JVD] : no jugular venous distention  [Ankle Swelling On The Right] : of the right ankle [Varicose Veins Of Lower Extremities] : not present [Varicose Veins Of The Right Leg] : of the right leg [] : of the right leg [Ankle Swelling Bilaterally] : severe [de-identified] : Elderly AA female  [de-identified] : non labored [FreeTextEntry1] : No overt ischemia right leg [de-identified] : limited ambulation [de-identified] : conversant

## 2022-10-11 ENCOUNTER — APPOINTMENT (OUTPATIENT)
Dept: DERMATOLOGY | Facility: CLINIC | Age: 87
End: 2022-10-11

## 2022-10-11 DIAGNOSIS — T14.8XXA OTHER INJURY OF UNSPECIFIED BODY REGION, INITIAL ENCOUNTER: ICD-10-CM

## 2022-10-11 DIAGNOSIS — R60.9 OTHER INJURY OF UNSPECIFIED BODY REGION, INITIAL ENCOUNTER: ICD-10-CM

## 2022-10-11 DIAGNOSIS — I89.0 LYMPHEDEMA, NOT ELSEWHERE CLASSIFIED: ICD-10-CM

## 2022-10-11 PROCEDURE — 99204 OFFICE O/P NEW MOD 45 MIN: CPT

## 2022-10-20 ENCOUNTER — APPOINTMENT (OUTPATIENT)
Dept: WOUND CARE | Facility: CLINIC | Age: 87
End: 2022-10-20

## 2022-10-20 VITALS — TEMPERATURE: 98 F

## 2022-10-20 PROCEDURE — 99213 OFFICE O/P EST LOW 20 MIN: CPT

## 2022-10-20 RX ORDER — PREDNISOLONE ACETATE 10 MG/ML
1 SUSPENSION/ DROPS OPHTHALMIC
Qty: 5 | Refills: 0 | Status: ACTIVE | COMMUNITY
Start: 2022-08-17

## 2022-10-20 RX ORDER — BRIMONIDINE TARTRATE, TIMOLOL MALEATE 2; 5 MG/ML; MG/ML
0.2-0.5 SOLUTION/ DROPS TOPICAL
Qty: 15 | Refills: 0 | Status: ACTIVE | COMMUNITY
Start: 2022-05-11

## 2022-10-20 NOTE — PHYSICAL EXAM
[Normal Breath Sounds] : Normal breath sounds [Ankle Swelling (On Exam)] : present [Ankle Swelling On The Right] : of the right ankle [Varicose Veins Of The Right Leg] : of the right leg [] : of the right leg [Ankle Swelling Bilaterally] : severe [Skin Ulcer] : ulcer [Alert] : alert [Oriented to Person] : oriented to person [Oriented to Place] : oriented to place [Oriented to Time] : oriented to time [Calm] : calm [Please See PDF for [employer] Analytics] : Please See PDF for [employer]. [JVD] : no jugular venous distention  [Varicose Veins Of Lower Extremities] : not present [de-identified] : Elderly AA female  [de-identified] : non labored [FreeTextEntry1] : No overt ischemia right leg [de-identified] : limited ambulation [de-identified] : conversant

## 2022-10-20 NOTE — HISTORY OF PRESENT ILLNESS
[FreeTextEntry1] : new ulcer, draining a lot  no fever ,\par  using extrasorb some progress\par  \par VN reports that wound of right leg has closed in1/20/21Wounds healed, dry and scabbed, just received lymphedema pump and has been using, using aquaphor for dry skin\par Moisturizes daily - additional products advised other than coca butter currently in use\par VN will measure for a new compression stocking\par Is compliant with diuretic\par no reflux on testing nl everette has pump and overload had closed last visit\par 11/20/2020New blister appeared on leg 11/16/2020. Patient states she has been unable to wear her compression stockings as she is unable to put them on by herself. \par Patient underwent evaluation for peripheral arterial disease using a [employer] diagnostic machine.  Ankle brachial index was obtained using blood pressure cuffs of the ankle and brachial segments of both legs.  A distal pulse volume recording was noted digitally on the device.  The procedure was supervised and interpreted by the physician.  EVERETTE of the right lower extremity PAD.   EVERETTE of the left lower extremity 0.87.  Waveform noted to be  biphasic.   Patient tolerated procedure well in the office.  Results discussed with the patient.\par 2/11/22\par Right leg venous ulcer , drainage is copious, only aquacel over, wound pink, malodorous, culture obtained, no compression on leg, has a nurse\par 9/29/22 Did not obtain CS, referral again given for Derm eval\par \par

## 2022-10-20 NOTE — ASSESSMENT
[FreeTextEntry1] : 86 yr w with  cellulitis and overload htn afib now on xarelto\par no bleeding,   wound to the right lower extremity.  some progress , different area on leg each time\par lymphedema pump \par adaptac\par s/p antibiotics\par \par extrasorb/  Swelling noted to the extremity.  less than previous\par EVERETTE/PVR: and venous reflux done and results discussed with patient.  wnl\par Edema noted no clinical sign of infection\par Recommendation:\par Apply lidocaine or topical anesthetic.\par Wash wound with ----0.9% saline/\par Apply ---foam/ Kerramax dynaflex extrasorbankle right and left 28.5\par Apply --- compression stocking \par Change dressing ---daily\par Leg elevation as tolerated\par Encouraged ambulation or exercise.\par Optimization of nutrition.\par Offloading to the wound site.\par not using lymphedema pump\par no recent vascular studies\par measured for compression garments\par 9/29/22  Referral again given for CS\par Will repeat vascular studies last done 2 yrs ago\par  patient none compliant with wearing of garment to left leg or using lymphydema pump\par Emphasized on importance of compression \par Maceration noted to the periwound, zinc oxide applied\par wound excisional debrided\par xtrasorb, multilayer dressing\par Nursing orders given\par Follow up in 2 weeks \par Patient using lymphedema pump 2x's per day\par   by nurse with Xtrasorb and unna boot, dressing was fully saturated\par  and skin was moist due to drainage.\par \par  \par RLE erythema and warmth up to knee.  Pt says started yesterday.  \par Pt still using pump 2x/day 20min each\par wounds still draining copious amounts\par On exam, leg with slightly more edema.  Warm and erythematous.\par   Wounds clean with red wound bed but tender on exam.  Cx taken.  \par No iodor fluctuance or crepitus\par 7/5/22\par right calf wounds drying up, less drainage using pump\par only mechanical debridement today\par applying A& D ointment to legs to moisturize\par \par 09/29/2022\par right calf wounds drying up, less drainage using pump\par only mechanical debridement today\par On exam, leg with slightly more edema.  Warm and erythematous.\par Xtrasorb, dynaflex\par Venous studies ordered\par Nursing orders given\par Follow up in 2 weeks\par \par 10/20/2022\par right calf wounds drying up, less drainage using pump\par only mechanical debridement today\par On exam, leg with slightly more edema.  Warm and erythematous. patient on toresamide\par Adaptic, Xtrasorb, dynaflex\par Venous studies ordered, but patient didn’t schedule due due to other medical appoitments, encouraged to make the appointment\par Nursing orders given\par Follow up in 2 weeks\par \par \par \par \par

## 2022-10-20 NOTE — PLAN
[FreeTextEntry1] : 10/20/2022\par Plan-\par Orders given for Nurse\par c/w adaptic super absorber and unna boot 3x's per week\par Compression Garments ordered.\par Venous studies ordered

## 2022-12-07 ENCOUNTER — APPOINTMENT (OUTPATIENT)
Dept: VASCULAR SURGERY | Facility: CLINIC | Age: 87
End: 2022-12-07

## 2022-12-08 ENCOUNTER — APPOINTMENT (OUTPATIENT)
Dept: WOUND CARE | Facility: CLINIC | Age: 87
End: 2022-12-08

## 2022-12-08 VITALS
TEMPERATURE: 97.3 F | HEART RATE: 78 BPM | SYSTOLIC BLOOD PRESSURE: 148 MMHG | DIASTOLIC BLOOD PRESSURE: 88 MMHG | RESPIRATION RATE: 18 BRPM

## 2022-12-08 DIAGNOSIS — Z86.79 PERSONAL HISTORY OF OTHER DISEASES OF THE CIRCULATORY SYSTEM: ICD-10-CM

## 2022-12-08 PROCEDURE — 99213 OFFICE O/P EST LOW 20 MIN: CPT

## 2022-12-08 RX ORDER — METOPROLOL SUCCINATE 100 MG/1
100 TABLET, EXTENDED RELEASE ORAL
Qty: 90 | Refills: 0 | Status: ACTIVE | COMMUNITY
Start: 2022-11-03

## 2022-12-08 RX ORDER — AMOXICILLIN AND CLAVULANATE POTASSIUM 875; 125 MG/1; MG/1
875-125 TABLET, COATED ORAL
Qty: 28 | Refills: 0 | Status: DISCONTINUED | COMMUNITY
Start: 2022-05-13 | End: 2022-12-08

## 2022-12-08 NOTE — PLAN
[FreeTextEntry1] : 12/08/2022\par Plan-\par Orders given for Nurse wash dakin\par c/w paint betadine adaptic super absorber and unna boot 3x's per week\par Compression Garments ordered.\par Venous studies ordered

## 2022-12-08 NOTE — ASSESSMENT
[FreeTextEntry1] : 87 yr w with  cellulitis and overload htn afib now on xarelto\par no bleeding,   wound to the right lower extremity.  some progress , different area on leg each time\par lymphedema pump \par adaptac extrasorb\par s/p antibiotics\par \par extrasorb/  Swelling noted to the extremity.  less than previous\par EVERETTE/PVR: and venous reflux done and results discussed with patient.  wnl\par Edema noted no clinical sign of infection\par Recommendation:\par Apply lidocaine or topical anesthetic.\par Wash wound with ----0.9% saline/\par Apply ---foam/ Kerramax dynaflex extrasorbankle right and left 28.5\par Apply --- compression stocking \par Change dressing ---daily\par Leg elevation as tolerated\par Encouraged ambulation or exercise.\par Optimization of nutrition.\par Offloading to the wound site.\par not using lymphedema pump\par no recent vascular studies\par measured for compression garments\par 9/29/22  Referral again given for CS\par Will repeat vascular studies last done 2 yrs ago\par  patient none compliant with wearing of garment to left leg or using lymphydema pump\par Emphasized on importance of compression \par Maceration noted to the periwound, zinc oxide applied\par wound excisional debrided\par xtrasorb, multilayer dressing\par Nursing orders given\par Follow up in 2 weeks \par Patient using lymphedema pump 2x's per day\par   by nurse with Xtrasorb and unna boot, dressing was fully saturated\par  and skin was moist due to drainage.\par \par  \par RLE erythema and warmth up to knee.  Pt says started yesterday.  \par Pt still using pump 2x/day 20min each\par wounds still draining copious amounts\par On exam, leg with slightly more edema.  Warm and erythematous.\par   Wounds clean with red wound bed but tender on exam.  Cx taken.  \par No iodor fluctuance or crepitus\par 7/5/22\par right calf wounds drying up, less drainage using pump\par only mechanical debridement today\par applying A& D ointment to legs to moisturize\par \par 09/29/2022\par right calf wounds drying up, less drainage using pump\par only mechanical debridement today\par On exam, leg with slightly more edema.  Warm and erythematous.\par Xtrasorb, dynaflex\par Venous studies ordered\par Nursing orders given\par Follow up in 2 weeks\par \par 12/08/2022\par right calf wounds drying up, less drainage using pump\par only mechanical debridement today\par On exam, leg with slightly more edema.  Warm and erythematous. patient on toresamide\par  paint betadine, Adaptic, Xtrasorb, dynaflex\par Venous studies ordered, was suppose to get it done on 12/07/2022, was canceled due to facility not being able to do the  dressing change , encouraged to make the appointment\par Nursing orders given\par Follow up in 2 weeks\par \par pt was positioned with team to expose the wound ( irvin)The patient was positioned to allow for optimization of imaging.\par   with room lights dimmed  The fluorescence imaging device was placed 8-12 cm from the patient and the clinical darkness required for imaging was obtained  \par The [employer] i:X fluorescence imaging device was used to report presence and location   red and cyan on edge fluorescence, indicative of bacteria at loads greater than 104 CFU/g in real-time. Images reported to have  fluorescence. The wound was -mechanically cleansed with Dakins 0.125% e) and/or underwent excisional debridement.  Fluorescence images acquired after cleansing demonstrated reduction in bacteria.\par  \par cleansed with bacteriocidal :dasakins\par  debridement  : 1 mm deeper into sub cut\par  \par \par \par

## 2022-12-08 NOTE — PHYSICAL EXAM
[Normal Breath Sounds] : Normal breath sounds [Ankle Swelling (On Exam)] : present [Ankle Swelling On The Right] : of the right ankle [Varicose Veins Of The Right Leg] : of the right leg [] : of the right leg [Ankle Swelling Bilaterally] : severe [Skin Ulcer] : ulcer [Alert] : alert [Oriented to Person] : oriented to person [Oriented to Place] : oriented to place [Oriented to Time] : oriented to time [Calm] : calm [Please See PDF for [employer] Analytics] : Please See PDF for [employer]. [JVD] : no jugular venous distention  [Varicose Veins Of Lower Extremities] : not present [de-identified] : Elderly AA female  [de-identified] : non labored [FreeTextEntry1] : No overt ischemia right leg [de-identified] : limited ambulation [de-identified] : conversant

## 2022-12-08 NOTE — HISTORY OF PRESENT ILLNESS
[FreeTextEntry1] : new ulcer, draining a lot  no fever , open wound latr and medial\par  using extrasorb some progress\par  \par VN reports that wound of right leg has closed in1/20/21Wounds healed, dry and scabbed, just received lymphedema pump and has been using, using aquaphor for dry skin\par Moisturizes daily - additional products advised other than coca butter currently in use\par VN will measure for a new compression stocking\par Is compliant with diuretic\par no reflux on testing nl everette has pump and overload had closed last visit\par 11/20/2020New blister appeared on leg 11/16/2020. Patient states she has been unable to wear her compression stockings as she is unable to put them on by herself. \par Patient underwent evaluation for peripheral arterial disease using a [employer] diagnostic machine.  Ankle brachial index was obtained using blood pressure cuffs of the ankle and brachial segments of both legs.  A distal pulse volume recording was noted digitally on the device.  The procedure was supervised and interpreted by the physician.  EVERETTE of the right lower extremity PAD.   EVERETTE of the left lower extremity 0.87.  Waveform noted to be  biphasic.   Patient tolerated procedure well in the office.  Results discussed with the patient.\par 2/11/22\par Right leg venous ulcer , drainage is copious, only aquacel over, wound pink, malodorous, culture obtained, no compression on leg, has a nurse\par 9/29/22 Did not obtain CS, referral again given for Derm eval\par \par

## 2022-12-27 ENCOUNTER — APPOINTMENT (OUTPATIENT)
Dept: WOUND CARE | Facility: CLINIC | Age: 87
End: 2022-12-27

## 2022-12-27 VITALS — SYSTOLIC BLOOD PRESSURE: 149 MMHG | DIASTOLIC BLOOD PRESSURE: 87 MMHG | HEART RATE: 91 BPM

## 2022-12-27 VITALS — TEMPERATURE: 97.4 F

## 2022-12-27 PROCEDURE — 99213 OFFICE O/P EST LOW 20 MIN: CPT | Mod: 25

## 2022-12-27 PROCEDURE — 11045 DBRDMT SUBQ TISS EACH ADDL: CPT

## 2022-12-27 PROCEDURE — 11042 DBRDMT SUBQ TIS 1ST 20SQCM/<: CPT

## 2022-12-27 NOTE — ASSESSMENT
[FreeTextEntry1] : 87 yr w with  cellulitis and overload htn afib now on xarelto\par no bleeding,   wound to the right lower extremity.  some progress , different area on leg each time\par lymphedema pump \par adaptac extrasorb betadine\par s/p antibiotics\par \par extrasorb/  Swelling noted to the extremity.  less than previous\par EVERETTE/PVR: and venous reflux done and results discussed with patient.  wnl\par Edema noted no clinical sign of infection\par Recommendation:\par Apply lidocaine or topical anesthetic.\par Wash wound with ----0.9% saline/\par Apply ---foam/ Kerramax dynaflex extrasorbankle right and left 28.5\par Apply --- compression stocking \par Change dressing ---daily\par Leg elevation as tolerated\par Encouraged ambulation or exercise.\par Optimization of nutrition.\par Offloading to the wound site.\par not using lymphedema pump\par no recent vascular studies\par measured for compression garments\par 9/29/22  Referral again given for CS\par Will repeat vascular studies last done 2 yrs ago\par  patient none compliant with wearing of garment to left leg or using lymphydema pump\par Emphasized on importance of compression \par Maceration noted to the periwound, zinc oxide applied\par wound excisional debrided\par xtrasorb, multilayer dressing\par Nursing orders given\par Follow up in 2 weeks \par Patient using lymphedema pump 2x's per day\par   by nurse with Xtrasorb and unna boot, dressing was fully saturated\par  and skin was moist due to drainage.\par \par  \par RLE erythema and warmth up to knee.  Pt says started yesterday.  \par Pt still using pump 2x/day 20min each\par wounds still draining copious amounts\par On exam, leg with slightly more edema.  Warm and erythematous.\par   Wounds clean with red wound bed but tender on exam.  Cx taken.  \par No iodor fluctuance or crepitus\par 7/5/22\par right calf wounds drying up, less drainage using pump\par only mechanical debridement today\par applying A& D ointment to legs to moisturize\par \par 09/29/2022\par right calf wounds drying up, less drainage using pump\par only mechanical debridement today\par On exam, leg with slightly more edema.  Warm and erythematous.\par Xtrasorb, dynaflex\par Venous studies ordered\par Nursing orders given\par Follow up in 2 weeks\par \par 12/08/2022\par right calf wounds drying up, less drainage using pump\par only mechanical debridement today\par On exam, leg with slightly more edema.  Warm and erythematous. patient on toresamide\par  paint betadine, Adaptic, Xtrasorb, dynaflex\par Venous studies ordered, was suppose to get it done on 12/07/2022, was canceled due to facility not being able to do the  dressing change , encouraged to make the appointment\par Nursing orders given\par Follow up in 2 weeks\par \par pt was positioned with team to expose the wound ( irvin)The patient was positioned to allow for optimization of imaging.\par   with room lights dimmed  The fluorescence imaging device was placed 8-12 cm from the patient and the clinical darkness required for imaging was obtained  \par The [employer] i:X fluorescence imaging device was used to report presence and location   red and cyan on edge fluorescence, indicative of bacteria at loads greater than 104 CFU/g in real-time. Images reported to have  fluorescence. The wound was -mechanically cleansed with Dakins 0.125% e) and/or underwent excisional debridement.  Fluorescence images acquired after cleansing demonstrated reduction in bacteria.\par  \par cleansed with bacteriocidal :dasakins\par  debridement  : 1 mm deeper into sub cut\par  \par \par \par

## 2022-12-27 NOTE — PHYSICAL EXAM
[Normal Breath Sounds] : Normal breath sounds [Ankle Swelling (On Exam)] : present [Ankle Swelling On The Right] : of the right ankle [Varicose Veins Of The Right Leg] : of the right leg [] : of the right leg [Ankle Swelling Bilaterally] : severe [Skin Ulcer] : ulcer [Alert] : alert [Oriented to Person] : oriented to person [Oriented to Place] : oriented to place [Oriented to Time] : oriented to time [Calm] : calm [Please See PDF for [employer] Analytics] : Please See PDF for [employer]. [JVD] : no jugular venous distention  [Varicose Veins Of Lower Extremities] : not present [de-identified] : Elderly AA female  [de-identified] : non labored [FreeTextEntry1] : No overt ischemia right leg [de-identified] : limited ambulation [de-identified] : conversant

## 2022-12-27 NOTE — PLAN
[FreeTextEntry1] : 12/27/2022\par Plan-\par needs US to review\par Orders given for Nurse wash dakin\par c/w paint betadine adaptic super absorber and unna boot 3x's per week\par Compression Garments ordered.\par Venous studies ordered

## 2022-12-27 NOTE — HISTORY OF PRESENT ILLNESS
[FreeTextEntry1] : new ulcer, draining a lot  no fever , open wound latr and medial\par  using extrasorb some progress\par  \par VN reports that wound of right leg has closed in1/20/21Wounds healed, dry and scabbed, just received lymphedema pump and has been using, using aquaphor for dry skin\par Moisturizes daily - additional products advised other than coca butter currently in use\par VN will measure for a new compression stocking\par Is compliant with diuretic\par no reflux on testing nl everette has pump and overload had closed last visit\par 11/20/2020New blister appeared on leg 11/16/2020. Patient states she has been unable to wear her compression stockings as she is unable to put them on by herself. \par Patient underwent evaluation for peripheral arterial disease using a [employer] diagnostic machine.  Ankle brachial index was obtained using blood pressure cuffs of the ankle and brachial segments of both legs.  A distal pulse volume recording was noted digitally on the device.  The procedure was supervised and interpreted by the physician.  EVERETTE of the right lower extremity PAD.   EVERETTE of the left lower extremity 0.87.  Waveform noted to be  biphasic.   Patient tolerated procedure well in the office.  Results discussed with the patient.\par 2/11/22\par Right leg venous ulcer , drainage is copious, only aquacel over, wound pink, malodorous, culture obtained, no compression on leg, has a nurse\par 9/29/22 Did not obtain CS, referral again given for Derm eval\par \par

## 2023-01-17 ENCOUNTER — APPOINTMENT (OUTPATIENT)
Dept: WOUND CARE | Facility: CLINIC | Age: 88
End: 2023-01-17
Payer: MEDICARE

## 2023-01-17 VITALS — TEMPERATURE: 96.8 F

## 2023-01-17 VITALS
HEIGHT: 67 IN | BODY MASS INDEX: 32.18 KG/M2 | DIASTOLIC BLOOD PRESSURE: 65 MMHG | SYSTOLIC BLOOD PRESSURE: 146 MMHG | HEART RATE: 87 BPM | WEIGHT: 205 LBS

## 2023-01-17 PROCEDURE — 99213 OFFICE O/P EST LOW 20 MIN: CPT

## 2023-01-24 NOTE — HISTORY OF PRESENT ILLNESS
[FreeTextEntry1] : new ulcer, draining a lot  no fever , open wound latr and medial\par  using extrasorb some progress\par  \par VN reports that wound of right leg has closed in1/20/21Wounds healed, dry and scabbed, just received lymphedema pump and has been using, using aquaphor for dry skin\par Moisturizes daily - additional products advised other than coca butter currently in use\par VN will measure for a new compression stocking\par Is compliant with diuretic\par no reflux on testing nl everette has pump and overload had closed last visit\par 11/20/2020New blister appeared on leg 11/16/2020. Patient states she has been unable to wear her compression stockings as she is unable to put them on by herself. \par Patient underwent evaluation for peripheral arterial disease using a [employer] diagnostic machine.  Ankle brachial index was obtained using blood pressure cuffs of the ankle and brachial segments of both legs.  A distal pulse volume recording was noted digitally on the device.  The procedure was supervised and interpreted by the physician.  EVERETTE of the right lower extremity PAD.   EVERETTE of the left lower extremity 0.87.  Waveform noted to be  biphasic.   Patient tolerated procedure well in the office.  Results discussed with the patient.\par 2/11/22\par Right leg venous ulcer , drainage is copious, only aquacel over, wound pink, malodorous, culture obtained, no compression on leg, has a nurse\par 9/29/22 Did not obtain CS, referral again given for Derm eval\par \par

## 2023-01-24 NOTE — PHYSICAL EXAM
[Normal Breath Sounds] : Normal breath sounds [Ankle Swelling (On Exam)] : present [Ankle Swelling On The Right] : of the right ankle [Varicose Veins Of The Right Leg] : of the right leg [] : of the right leg [Ankle Swelling Bilaterally] : severe [Skin Ulcer] : ulcer [Alert] : alert [Oriented to Person] : oriented to person [Oriented to Place] : oriented to place [Oriented to Time] : oriented to time [Calm] : calm [Please See PDF for [employer] Analytics] : Please See PDF for [employer]. [JVD] : no jugular venous distention  [Varicose Veins Of Lower Extremities] : not present [de-identified] : Elderly AA female  [de-identified] : non labored [FreeTextEntry1] : No overt ischemia right leg [de-identified] : limited ambulation [de-identified] : conversant

## 2023-01-24 NOTE — ASSESSMENT
[FreeTextEntry1] : 1/17/23\par 87 yr w with  cellulitis and overload htn afib now on xarelto\par no bleeding,   wound to the right lower extremity.  some progress , different area on leg each time\par lymphedema pump \par alginate extrasorb  gentian blue , too wet for a skin sub\par using pump, steroids this week\par s/p antibiotics\par \par extrasorb/  Swelling noted to the extremity.  \par EVERETTE/PVR: and venous reflux done and results discussed with patient.  wnl- needs repeat this year\par Edema noted no clinical sign of infection\par Recommendation:\par Apply lidocaine or topical anesthetic.\par Wash wound with ----0.9% saline/\par Apply ---foam/ Kerramax dynaflex extrasorbankle right and left 28.5\par Apply --- compression stocking \par Change dressing ---daily\par Leg elevation as tolerated\par Encouraged ambulation or exercise.\par Optimization of nutrition.\par Offloading to the wound site.\par not using lymphedema pump\par no recent vascular studies\par measured for compression garments\par  \par Nursing orders given\par Follow up in 2 weeks \par Patient using lymphedema pump 2x's per day\par   by nurse with Xtrasorb and unna boot, dressing was fully saturated\par  and skin was moist due to drainage.\par \par  \par RLE erythema and warmth up to knee.  Pt says started yesterday.  \par Pt still using pump 2x/day 20min each\par wounds still draining copious amounts\par On exam, leg with slightly more edema.  Warm and erythematous.\par   Wounds clean with red wound bed but tender on exam.  Cx taken.  \par No iodor fluctuance or crepitus\par  \par Venous studies ordered\par Nursing orders given\par Follow up in 2 weeks\par \par 12/08/2022\par right calf wounds drying up, less drainage using pump\par only mechanical debridement today\par On exam, leg with slightly more edema.  Warm and erythematous. patient on toresamide\par  paint betadine, Adaptic, Xtrasorb, dynaflex\par Venous studies ordered, was suppose to get it done on 12/07/2022, was canceled due to facility not being able to do the  dressing change , encouraged to make the appointment\par Nursing orders given\par Follow up in 2 weeks\par \par pt was positioned with team to expose the wound ( irvin)The patient was positioned to allow for optimization of imaging.\par   with room lights dimmed  The fluorescence imaging device was placed 8-12 cm from the patient and the clinical darkness required for imaging was obtained  \par The [employer] i:X fluorescence imaging device was used to report presence and location   red and cyan on edge fluorescence, indicative of bacteria at loads greater than 104 CFU/g in real-time. Images reported to have  fluorescence. The wound was -mechanically cleansed with Dakins 0.125% e) and/or underwent excisional debridement.  Fluorescence images acquired after cleansing demonstrated reduction in bacteria.\par  \par cleansed with bacteriocidal :dasakins\par  debridement  : 1 mm deeper into sub cut\par  \par \par \par

## 2023-01-24 NOTE — PLAN
[FreeTextEntry1] : 1/17\par Plan- swollen, will trial medrol this week, gentian violet\par needs US to review\par Orders given for Nurse wash dakin\par c/w paint  alginate super absorber and unna boot 3x's per week\par Compression Garments ordered.\par Venous studies ordered

## 2023-01-31 ENCOUNTER — APPOINTMENT (OUTPATIENT)
Dept: WOUND CARE | Facility: CLINIC | Age: 88
End: 2023-01-31
Payer: MEDICARE

## 2023-01-31 VITALS — HEART RATE: 74 BPM | SYSTOLIC BLOOD PRESSURE: 153 MMHG | DIASTOLIC BLOOD PRESSURE: 75 MMHG

## 2023-01-31 VITALS — TEMPERATURE: 98.2 F

## 2023-01-31 PROCEDURE — 99213 OFFICE O/P EST LOW 20 MIN: CPT

## 2023-01-31 NOTE — PLAN
[FreeTextEntry1] : 1/17\par Plan- swollen, will trial medrol this week, gentian violet\par needs US to review\par Orders given for Nurse wash dakin\par c/w paint  alginate super absorber and unna boot 3x's per week\par Compression Garments ordered.\par Venous studies ordered\par \par 1/31/23\par Plan:\par Antibiotics this week\par Wash with Dakin's, pat dry,apply moisture barrier cream at the ankle to prevent maceration, paint with gentian violet, apply Adaptic to the wound bed and cover with alginate super absorber and multilayer dressing\par Change three times a week\par Follow up in 2-3 weeks\par \par

## 2023-01-31 NOTE — PHYSICAL EXAM
[JVD] : no jugular venous distention  [Varicose Veins Of Lower Extremities] : not present [de-identified] : Elderly AA female  [de-identified] : WNL [de-identified] : non labored [FreeTextEntry1] : No overt ischemia right leg [de-identified] : limited ambulation [de-identified] : conversant

## 2023-01-31 NOTE — ASSESSMENT
[FreeTextEntry1] :  1/31/23\par 87 yr w with  cellulitis and overload htn afib now on xarelto- repeat gentian blue\par no bleeding,   wound to the right lower extremity.  some progress , different area on leg each time\par lymphedema pump \par alginate extrasorb  gentian blue , too wet for a skin sub\par using pump, steroids this week\par s/p antibiotics\par \par extrasorb/  Swelling noted to the extremity.  \par EVERETTE/PVR: and venous reflux done and results discussed with patient.  wnl- needs repeat this year\par Edema noted no clinical sign of infection\par Recommendation:\par Apply lidocaine or topical anesthetic.\par Wash wound with ----0.9% saline/\par Apply ---foam/ Kerramax dynaflex extrasorbankle right and left 28.5\par Apply --- compression stocking \par Change dressing ---daily\par Leg elevation as tolerated\par Encouraged ambulation or exercise.\par Optimization of nutrition.\par Offloading to the wound site.\par not using lymphedema pump\par no recent vascular studies\par measured for compression garments\par  \par Nursing orders given\par Follow up in 2 weeks \par Patient using lymphedema pump 2x's per day\par   by nurse with Xtrasorb and unnalejandra hughesot, dressing was fully saturated\par  and skin was moist due to drainage.\par \par  \par RLE erythema and warmth up to knee.  Pt says started yesterday.  \par Pt still using pump 2x/day 20min each\par wounds still draining copious amounts\par On exam, leg with slightly more edema.  Warm and erythematous.\par   Wounds clean with red wound bed but tender on exam.  Cx taken.  \par No iodor fluctuance or crepitus\par  \par Venous studies ordered\par Nursing orders given\par Follow up in 2 weeks\par \par 12/08/2022\par right calf wounds drying up, less drainage using pump\par only mechanical debridement today\par On exam, leg with slightly more edema.  Warm and erythematous. patient on toresamide\par  paint betadine, Adaptic, Xtrasorb, dynaflex\par Venous studies ordered, was suppose to get it done on 12/07/2022, was canceled due to facility not being able to do the  dressing change , encouraged to make the appointment\par Nursing orders given\par Follow up in 2 weeks\par \par pt was positioned with team to expose the wound ( irvin)The patient was positioned to allow for optimization of imaging.\par   with room lights dimmed  The fluorescence imaging device was placed 8-12 cm from the patient and the clinical darkness required for imaging was obtained  \par The [employer] i:X fluorescence imaging device was used to report presence and location   red and cyan on edge fluorescence, indicative of bacteria at loads greater than 104 CFU/g in real-time. Images reported to have  fluorescence. The wound was -mechanically cleansed with Dakins 0.125% e) and/or underwent excisional debridement.  Fluorescence images acquired after cleansing demonstrated reduction in bacteria.\par  \par cleansed with bacteriocidal :dasakins\par  debridement  : 1 mm deeper into sub cut\par \par 1/31/23\par Patient presents with right calf wound with a large amount of drainage. Patient using methylene blue to paint wounds. Patient denies pain at this time. Unna boot being changed three times a week by home care nurse.\par mechanical debridement with Vashe\par Antibiotics ordered\par Adaptic/Exu-dry/multilayer dressing today\par Nursing orders given\par Follow up in 2-3 weeks\par \par \par

## 2023-01-31 NOTE — HISTORY OF PRESENT ILLNESS
[FreeTextEntry1] : new ulcer, draining a lot  no fever , open wound latr and medial\par  using extrasorb some progress used gentian violet last visit some improvement\par no fevers\par  \par VN reports that wound of right leg has closed in1/20/21Wounds healed, dry and scabbed, just received lymphedema pump and has been using, using aquaphor for dry skin\par Moisturizes daily - additional products advised other than coca butter currently in use\par VN will measure for a new compression stocking\par Is compliant with diuretic\par no reflux on testing nl everette has pump and overload had closed last visit\par 11/20/2020New blister appeared on leg 11/16/2020. Patient states she has been unable to wear her compression stockings as she is unable to put them on by herself. \par Patient underwent evaluation for peripheral arterial disease using a [employer] diagnostic machine.  Ankle brachial index was obtained using blood pressure cuffs of the ankle and brachial segments of both legs.  A distal pulse volume recording was noted digitally on the device.  The procedure was supervised and interpreted by the physician.  EVERETTE of the right lower extremity PAD.   EVERETTE of the left lower extremity 0.87.  Waveform noted to be  biphasic.   Patient tolerated procedure well in the office.  Results discussed with the patient.\par 2/11/22\par Right leg venous ulcer , drainage is copious, only aquacel over, wound pink, malodorous, culture obtained, no compression on leg, has a nurse\par 9/29/22 Did not obtain CS, referral again given for Derm eval\par \par

## 2023-02-21 ENCOUNTER — APPOINTMENT (OUTPATIENT)
Dept: WOUND CARE | Facility: CLINIC | Age: 88
End: 2023-02-21
Payer: MEDICARE

## 2023-02-21 PROCEDURE — 11042 DBRDMT SUBQ TIS 1ST 20SQCM/<: CPT

## 2023-02-21 PROCEDURE — 11045 DBRDMT SUBQ TISS EACH ADDL: CPT

## 2023-02-21 PROCEDURE — 99213 OFFICE O/P EST LOW 20 MIN: CPT | Mod: 25

## 2023-02-21 RX ORDER — METHYLPREDNISOLONE 2 MG/1
2 TABLET ORAL
Qty: 3 | Refills: 0 | Status: ACTIVE | COMMUNITY
Start: 2023-01-17 | End: 1900-01-01

## 2023-02-21 RX ORDER — FUROSEMIDE 20 MG/1
20 TABLET ORAL DAILY
Qty: 10 | Refills: 0 | Status: ACTIVE | COMMUNITY
Start: 2022-05-19 | End: 1900-01-01

## 2023-02-23 NOTE — ASSESSMENT
[FreeTextEntry1] : 2/21\par 87 yr w with  cellulitis and overload htn afib now on xarelto- repeat gentian blue again today\par debrided 1 mm deeper into sub cut\par no bleeding,   wound to the right lower extremity.  some progress , different area on leg each time\par lymphedema pump \par alginate extrasorb  gentian blue , too wet for a skin sub\par using pump, steroids this week\par s/p antibiotics\par \par extrasorb/  Swelling noted to the extremity.  \par EVERETTE/PVR: and venous reflux done and results discussed with patient.  wnl- needs repeat this year\par Edema noted no clinical sign of infection\par Recommendation:\par Apply lidocaine or topical anesthetic.\par Wash wound with ----0.9% saline/\par Apply ---foam/ Kerramax dynaflex extrasorbankle right and left 28.5\par Apply --- compression stocking \par Change dressing ---daily\par Leg elevation as tolerated\par Encouraged ambulation or exercise.\par Optimization of nutrition.\par Offloading to the wound site.\par not using lymphedema pump\par no recent vascular studies\par measured for compression garments\par  \par Nursing orders given\par Follow up in 2 weeks \par Patient using lymphedema pump 2x's per day\par   by nurse with Xtrasorb and unna boot, dressing was fully saturated\par  and skin was moist due to drainage.\par \par  \par RLE erythema and warmth up to knee.  Pt says started yesterday.  \par Pt still using pump 2x/day 20min each\par wounds still draining copious amounts\par On exam, leg with slightly more edema.  Warm and erythematous.\par   Wounds clean with red wound bed but tender on exam.  Cx taken.  \par No iodor fluctuance or crepitus\par  \par Venous studies ordered\par Nursing orders given\par Follow up in 2 weeks\par \par 12/08/2022\par right calf wounds drying up, less drainage using pump\par only mechanical debridement today\par On exam, leg with slightly more edema.  Warm and erythematous. patient on toresamide\par  paint betadine, Adaptic, Xtrasorb, dynaflex\par Venous studies ordered, was suppose to get it done on 12/07/2022, was canceled due to facility not being able to do the  dressing change , encouraged to make the appointment\par Nursing orders given\par Follow up in 2 weeks\par \par pt was positioned with team to expose the wound ( irvin)The patient was positioned to allow for optimization of imaging.\par   with room lights dimmed  The fluorescence imaging device was placed 8-12 cm from the patient and the clinical darkness required for imaging was obtained  \par The [employer] i:X fluorescence imaging device was used to report presence and location   red and cyan on edge fluorescence, indicative of bacteria at loads greater than 104 CFU/g in real-time. Images reported to have  fluorescence. The wound was -mechanically cleansed with Dakins 0.125% e) and/or underwent excisional debridement.  Fluorescence images acquired after cleansing demonstrated reduction in bacteria.\par  \par cleansed with bacteriocidal :dasakins\par  debridement  : 1 mm deeper into sub cut\par \par 1/31/23\par Patient presents with right calf wound with a large amount of drainage. Patient using methylene blue to paint wounds. Patient denies pain at this time. Unna boot being changed three times a week by home care nurse.\par mechanical debridement with Vashe\par Antibiotics ordered\par Adaptic/Exu-dry/multilayer dressing today\par Nursing orders given\par Follow up in 2-3 weeks\par \par \par

## 2023-02-23 NOTE — HISTORY OF PRESENT ILLNESS
[FreeTextEntry1] : new ulcer, draining a lot  no fever , open wound latr and medial\par  using extrasorb some progress used gentian violet last visit some improvement\par no fevers\par using the gentian violtet\par  \par VN reports that wound of right leg has closed in1/20/21Wounds healed, dry and scabbed, just received lymphedema pump and has been using, using aquaphor for dry skin\par Moisturizes daily - additional products advised other than coca butter currently in use\par VN will measure for a new compression stocking\par Is compliant with diuretic\par no reflux on testing nl everette has pump and overload had closed last visit\par 11/20/2020New blister appeared on leg 11/16/2020. Patient states she has been unable to wear her compression stockings as she is unable to put them on by herself. \par Patient underwent evaluation for peripheral arterial disease using a [employer] diagnostic machine.  Ankle brachial index was obtained using blood pressure cuffs of the ankle and brachial segments of both legs.  A distal pulse volume recording was noted digitally on the device.  The procedure was supervised and interpreted by the physician.  EVERETTE of the right lower extremity PAD.   EVERETTE of the left lower extremity 0.87.  Waveform noted to be  biphasic.   Patient tolerated procedure well in the office.  Results discussed with the patient.\par 2/11/22\par Right leg venous ulcer , drainage is copious, only aquacel over, wound pink, malodorous, culture obtained, no compression on leg, has a nurse\par 9/29/22 Did not obtain CS, referral again given for Derm eval\par \par

## 2023-02-23 NOTE — PHYSICAL EXAM
[Normal Breath Sounds] : Normal breath sounds [0] : left 0 [Ankle Swelling (On Exam)] : present [Ankle Swelling On The Right] : of the right ankle [Varicose Veins Of The Right Leg] : of the right leg [] : of the right leg [Ankle Swelling Bilaterally] : severe [Skin Ulcer] : ulcer [Alert] : alert [Oriented to Person] : oriented to person [Oriented to Place] : oriented to place [Oriented to Time] : oriented to time [Calm] : calm [Please See PDF for Tissue Analytics] : Please See PDF for [employer]. [JVD] : no jugular venous distention  [Varicose Veins Of Lower Extremities] : not present [de-identified] : Elderly AA female  [de-identified] : WNL [de-identified] : non labored [FreeTextEntry1] : No overt ischemia right leg [de-identified] : limited ambulation [de-identified] : conversant

## 2023-03-23 ENCOUNTER — APPOINTMENT (OUTPATIENT)
Dept: WOUND CARE | Facility: CLINIC | Age: 88
End: 2023-03-23
Payer: MEDICARE

## 2023-03-23 VITALS
SYSTOLIC BLOOD PRESSURE: 146 MMHG | OXYGEN SATURATION: 96 % | DIASTOLIC BLOOD PRESSURE: 81 MMHG | RESPIRATION RATE: 18 BRPM | TEMPERATURE: 97.6 F | HEART RATE: 70 BPM

## 2023-03-23 PROCEDURE — 99213 OFFICE O/P EST LOW 20 MIN: CPT

## 2023-03-23 NOTE — PHYSICAL EXAM
[JVD] : no jugular venous distention  [Normal Breath Sounds] : Normal breath sounds [0] : left 0 [Ankle Swelling (On Exam)] : present [Ankle Swelling On The Right] : of the right ankle [Varicose Veins Of Lower Extremities] : not present [Varicose Veins Of The Right Leg] : of the right leg [] : of the right leg [Ankle Swelling Bilaterally] : severe [Skin Ulcer] : ulcer [Alert] : alert [Oriented to Person] : oriented to person [Oriented to Place] : oriented to place [Oriented to Time] : oriented to time [Calm] : calm [de-identified] : Elderly AA female  [de-identified] : WNL [de-identified] : non labored [FreeTextEntry1] : No overt ischemia right leg [de-identified] : limited ambulation [de-identified] : conversant [Please See PDF for Tissue Analytics] : Please See PDF for [employer] Analytics.

## 2023-03-23 NOTE — ASSESSMENT
[FreeTextEntry1] : 3/23/23\par 87 yr w with  cellulitis and overload htn afib now on xarelto- repeat gentian blue again today\par trial augmentin\par no bleeding,   wound to the right lower extremity.  some progress , different area on leg each time\par lymphedema pump \par alginate extrasorb  gentian blue , too wet for a skin sub\par 2/21\par 87 yr w with  cellulitis and overload htn afib now on xarelto- repeat gentian blue again today\par debrided 1 mm deeper into sub cut\par no bleeding,   wound to the right lower extremity.  some progress , different area on leg each time\par lymphedema pump \par alginate extrasorb  gentian blue , too wet for a skin sub\par using pump, steroids this week\par s/p antibiotics\par \par extrasorb/  Swelling noted to the extremity.  \par EVERETTE/PVR: and venous reflux done and results discussed with patient.  wnl- needs repeat this year\par Edema noted no clinical sign of infection\par Recommendation:\par Apply lidocaine or topical anesthetic.\par Wash wound with ----0.9% saline/\par Apply ---foam/ Kerramax dynaflex extrasorbankle right and left 28.5\par Apply --- compression stocking \par Change dressing ---daily\par Leg elevation as tolerated\par Encouraged ambulation or exercise.\par Optimization of nutrition.\par Offloading to the wound site.\par not using lymphedema pump\par no recent vascular studies\par measured for compression garments\par  \par Nursing orders given\par Follow up in 2 weeks \par Patient using lymphedema pump 2x's per day\par   by nurse with Xtrasorb and unna boot, dressing was fully saturated\par  and skin was moist due to drainage.\par \par  \par RLE erythema and warmth up to knee.  Pt says started yesterday.  \par Pt still using pump 2x/day 20min each\par wounds still draining copious amounts\par On exam, leg with slightly more edema.  Warm and erythematous.\par   Wounds clean with red wound bed but tender on exam.  Cx taken.  \par No iodor fluctuance or crepitus\par  \par Venous studies ordered\par Nursing orders given\par Follow up in 2 weeks\par \par 12/08/2022\par right calf wounds drying up, less drainage using pump\par only mechanical debridement today\par On exam, leg with slightly more edema.  Warm and erythematous. patient on toresamide\par  paint betadine, Adaptic, Xtrasorb, dynaflex\par Venous studies ordered, was suppose to get it done on 12/07/2022, was canceled due to facility not being able to do the  dressing change , encouraged to make the appointment\par Nursing orders given\par Follow up in 2 weeks\par \par pt was positioned with team to expose the wound ( irvin)The patient was positioned to allow for optimization of imaging.\par   with room lights dimmed  The fluorescence imaging device was placed 8-12 cm from the patient and the clinical darkness required for imaging was obtained  \par The [employer] i:X fluorescence imaging device was used to report presence and location   red and cyan on edge fluorescence, indicative of bacteria at loads greater than 104 CFU/g in real-time. Images reported to have  fluorescence. The wound was -mechanically cleansed with Dakins 0.125% e) and/or underwent excisional debridement.  Fluorescence images acquired after cleansing demonstrated reduction in bacteria.\par  \par cleansed with bacteriocidal :dasakins\par  debridement  : 1 mm deeper into sub cut\par \par 1/31/23\par Patient presents with right calf wound with a large amount of drainage. Patient using methylene blue to paint wounds. Patient denies pain at this time. Unna boot being changed three times a week by home care nurse.\par mechanical debridement with Vashe\par Antibiotics ordered\par Adaptic/Exu-dry/multilayer dressing today\par Nursing orders given\par Follow up in 2-3 weeks\par \par \par

## 2023-04-20 ENCOUNTER — APPOINTMENT (OUTPATIENT)
Dept: WOUND CARE | Facility: CLINIC | Age: 88
End: 2023-04-20
Payer: MEDICARE

## 2023-04-20 VITALS — SYSTOLIC BLOOD PRESSURE: 165 MMHG | HEART RATE: 73 BPM | DIASTOLIC BLOOD PRESSURE: 81 MMHG

## 2023-04-20 PROCEDURE — 99213 OFFICE O/P EST LOW 20 MIN: CPT

## 2023-04-30 NOTE — ASSESSMENT
[FreeTextEntry1] : 4/20 Not making much progress with gential violet\par will start antibiotics amoxicillin reordered\par \par 3/23/23\par 87 yr w with  cellulitis and overload htn afib now on xarelto- repeat gentian blue again today\par trial augmentin\par no bleeding,   wound to the right lower extremity.  some progress , different area on leg each time\par lymphedema pump \par alginate extrasorb  gentian blue , too wet for a skin sub\par  extrasorb/  Swelling noted to the extremity.  \par EVERETTE/PVR: and venous reflux done and results discussed with patient.  wnl- needs repeat this year\par Edema noted no clinical sign of infection\par Recommendation:\par Apply lidocaine or topical anesthetic.\par Wash wound with ----0.9% saline/\par Apply ---foam/ Kerramax dynaflex extrasorbankle right and left 28.5\par Apply --- compression stocking \par Change dressing ---daily\par Leg elevation as tolerated\par Encouraged ambulation or exercise.\par Optimization of nutrition.\par Offloading to the wound site.\par not using lymphedema pump\par no recent vascular studies\par measured for compression garments\par  \par Nursing orders given\par Follow up in 2 weeks \par Patient using lymphedema pump 2x's per day\par   by nurse with Xtrasorb and unna boot, dressing was fully saturated\par  and skin was moist due to drainage.\par \par  \par RLE erythema and warmth up to knee.  Pt says started yesterday.  \par Pt still using pump 2x/day 20min each\par wounds still draining copious amounts\par On exam, leg with slightly more edema.  Warm and erythematous.\par   Wounds clean with red wound bed but tender on exam.  Cx taken.  \par No iodor fluctuance or crepitus\par  \par Venous studies ordered\par Nursing orders given\par Follow up in 2 weeks\par \par 12/08/2022\par right calf wounds drying up, less drainage using pump\par only mechanical debridement today\par On exam, leg with slightly more edema.  Warm and erythematous. patient on toresamide\par  paint betadine, Adaptic, Xtrasorb, dynaflex\par Venous studies ordered, was suppose to get it done on 12/07/2022, was canceled due to facility not being able to do the  dressing change , encouraged to make the appointment\par Nursing orders given\par Follow up in 2 weeks\par \par pt was positioned with team to expose the wound ( irvin)The patient was positioned to allow for optimization of imaging.\par   with room lights dimmed  The fluorescence imaging device was placed 8-12 cm from the patient and the clinical darkness required for imaging was obtained  \par The [employer] i:X fluorescence imaging device was used to report presence and location   red and cyan on edge fluorescence, indicative of bacteria at loads greater than 104 CFU/g in real-time. Images reported to have  fluorescence. The wound was -mechanically cleansed with Dakins 0.125% e) and/or underwent excisional debridement.  Fluorescence images acquired after cleansing demonstrated reduction in bacteria.\par  \par cleansed with bacteriocidal :dasakins\par  debridement  : 1 mm deeper into sub cut\par \par 1/31/23\par Patient presents with right calf wound with a large amount of drainage. Patient using methylene blue to paint wounds. Patient denies pain at this time. Unna boot being changed three times a week by home care nurse.\par mechanical debridement with Vashe\par Antibiotics ordered\par Adaptic/Exu-dry/multilayer dressing today\par Nursing orders given\par Follow up in 2-3 weeks\par \par 04/20/2023\par Patient presents with right calf wound with a large amount of drainage. Patient using methylene blue to paint wounds. Patient denies pain at this time. Unna boot being changed three times a week by home care nurse.\par mechanical debridement with Vashe\par Antibiotics ordered\par Adaptic/ drawtex, Exu-dry/multilayer dressing today, stop violet\par Nursing orders given\par Follow up in 2-3 weeks

## 2023-04-30 NOTE — PHYSICAL EXAM
[Normal Breath Sounds] : Normal breath sounds [0] : left 0 [Ankle Swelling (On Exam)] : present [Ankle Swelling On The Right] : of the right ankle [Varicose Veins Of The Right Leg] : of the right leg [] : of the right leg [Ankle Swelling Bilaterally] : severe [Skin Ulcer] : ulcer [Alert] : alert [Oriented to Person] : oriented to person [Oriented to Place] : oriented to place [Oriented to Time] : oriented to time [Calm] : calm [Please See PDF for Tissue Analytics] : Please See PDF for [employer] Analytics. [JVD] : no jugular venous distention  [Varicose Veins Of Lower Extremities] : not present [de-identified] : WNL [de-identified] : Elderly AA female  [de-identified] : non labored [FreeTextEntry1] : No overt ischemia right leg [de-identified] : limited ambulation [de-identified] : conversant

## 2023-04-30 NOTE — PLAN
[FreeTextEntry1] :  \par Antibiotics this week\par Wash with Dakin's, pat dry,apply moisture barrier cream at the ankle to prevent maceration,  apply Adaptic to the wound bed and cover with alginate super absorber and multilayer dressing\par Change three times a week\par Follow up in 2-3 weeks\par \par

## 2023-05-11 ENCOUNTER — OUTPATIENT (OUTPATIENT)
Dept: OUTPATIENT SERVICES | Facility: HOSPITAL | Age: 88
LOS: 1 days | End: 2023-05-11
Payer: MEDICARE

## 2023-05-11 ENCOUNTER — APPOINTMENT (OUTPATIENT)
Dept: WOUND CARE | Facility: CLINIC | Age: 88
End: 2023-05-11
Payer: MEDICARE

## 2023-05-11 VITALS
OXYGEN SATURATION: 100 % | TEMPERATURE: 97.8 F | HEART RATE: 71 BPM | DIASTOLIC BLOOD PRESSURE: 85 MMHG | RESPIRATION RATE: 18 BRPM | SYSTOLIC BLOOD PRESSURE: 146 MMHG

## 2023-05-11 PROCEDURE — G0463: CPT

## 2023-05-11 PROCEDURE — 99213 OFFICE O/P EST LOW 20 MIN: CPT

## 2023-05-11 NOTE — PLAN
[FreeTextEntry1] : \par Antibiotics dis not help, culture taken\par Wash with Dakin's, pat dry,apply moisture barrier cream at the ankle to prevent maceration,  apply Adaptic to the wound bed and cover with alginate super absorber and multilayer dressing\par Change three times a week\par Follow up in 2-3 weeks\par \par 5/11/23\par Plan:\par wash wound soap and water\par Pain with Betadine\par Multiple Superabsorber/Multilayer layer compression dressing\par Change 3 times a week.\par

## 2023-05-11 NOTE — PHYSICAL EXAM
[Normal Breath Sounds] : Normal breath sounds [0] : left 0 [Ankle Swelling (On Exam)] : present [Ankle Swelling On The Right] : of the right ankle [Varicose Veins Of The Right Leg] : of the right leg [] : of the right leg [Ankle Swelling Bilaterally] : severe [Skin Ulcer] : ulcer [Alert] : alert [Oriented to Person] : oriented to person [Oriented to Place] : oriented to place [Oriented to Time] : oriented to time [Calm] : calm [Please See PDF for Tissue Analytics] : Please See PDF for [employer]. [JVD] : no jugular venous distention  [Varicose Veins Of Lower Extremities] : not present [de-identified] : Elderly AA female  [de-identified] : WNL [de-identified] : non labored [FreeTextEntry1] : No overt ischemia right leg [de-identified] : limited ambulation [de-identified] : conversant

## 2023-05-11 NOTE — HISTORY OF PRESENT ILLNESS
[FreeTextEntry1] : new ulcer, draining a lot  no fever , open wound latr and medial\par took antibiotics ,  , had stopped blue gentian\par  using extrasorb some progress used gentian violet last visit some improvement\par no fevers\par using the gentian violtet\par  \par VN reports that wound of right leg has closed in1/20/21Wounds healed, dry and scabbed, just received lymphedema pump and has been using, using aquaphor for dry skin\par Moisturizes daily - additional products advised other than coca butter currently in use\par VN will measure for a new compression stocking\par Is compliant with diuretic\par no reflux on testing nl everette has pump and overload had closed last visit\par 11/20/2020New blister appeared on leg 11/16/2020. Patient states she has been unable to wear her compression stockings as she is unable to put them on by herself. \par Patient underwent evaluation for peripheral arterial disease using a [employer] diagnostic machine.  Ankle brachial index was obtained using blood pressure cuffs of the ankle and brachial segments of both legs.  A distal pulse volume recording was noted digitally on the device.  The procedure was supervised and interpreted by the physician.  EVERETTE of the right lower extremity PAD.   EVERETTE of the left lower extremity 0.87.  Waveform noted to be  biphasic.   Patient tolerated procedure well in the office.  Results discussed with the patient.\par 2/11/22\par Right leg venous ulcer , drainage is copious, only aquacel over, wound pink, malodorous, culture obtained, no compression on leg, has a nurse\par 9/29/22 Did not obtain CS, referral again given for Derm eval\par \par

## 2023-06-01 ENCOUNTER — APPOINTMENT (OUTPATIENT)
Dept: WOUND CARE | Facility: CLINIC | Age: 88
End: 2023-06-01
Payer: MEDICARE

## 2023-06-01 VITALS — TEMPERATURE: 97.7 F

## 2023-06-01 PROCEDURE — 99213 OFFICE O/P EST LOW 20 MIN: CPT

## 2023-06-01 NOTE — HISTORY OF PRESENT ILLNESS
[FreeTextEntry1] : new ulcer, draining a lot  no fever , open wound latr and medial\par took antibiotics ,  , had stopped blue gentian\par  using extrasorb some progress used gentian violet last visit some improvement\par no fevers\par using the gentian violtet\par  \par VN reports that wound of right leg has closed in1/20/21Wounds healed, dry and scabbed, just received lymphedema pump and has been using, using aquaphor for dry skin\par Moisturizes daily - additional products advised other than coca butter currently in use\par VN will measure for a new compression stocking\par Is compliant with diuretic\par no reflux on testing nl everette has pump and overload had closed last visit\par 11/20/2020New blister appeared on leg 11/16/2020. Patient states she has been unable to wear her compression stockings as she is unable to put them on by herself. \par Patient underwent evaluation for peripheral arterial disease using a [employer] diagnostic machine.  Ankle brachial index was obtained using blood pressure cuffs of the ankle and brachial segments of both legs.  A distal pulse volume recording was noted digitally on the device.  The procedure was supervised and interpreted by the physician.  EVERETTE of the right lower extremity PAD.   EVERETTE of the left lower extremity 0.87.  Waveform noted to be  biphasic.   Patient tolerated procedure well in the office.  Results discussed with the patient.\par 2/11/22\par Right leg venous ulcer , drainage is copious, only aquacel over, wound pink, malodorous, culture obtained, no compression on leg, has a nurse\par 9/29/22 Did not obtain CS, referral again given for Derm eval\par \par \par \par \par \par

## 2023-06-01 NOTE — PLAN
[FreeTextEntry1] : \par Antibiotics dis not help, culture taken\par Wash with Dakin's, pat dry,apply moisture barrier cream at the ankle to prevent maceration,  apply Adaptic to the wound bed and cover with alginate super absorber and multilayer dressing\par Change three times a week\par Follow up in 2-3 weeks\par \par 5/11/23\par Plan:\par wash wound soap and water\par Pain with Betadine\par Multiple Superabsorber/Multilayer layer compression dressing\par Change 3 times a week.\par \par 6/1/23\par Plan:\par Wash wounds with soap and water\par Acetic acid rinse\par Paint with Betadine\par Zinc to periwound area\par Xtrasorb/Multilayer compression dressing\par Change 3 times a week\par Continue Lymphedema pump\par Order TWO\par Nursing orders given\par Follow up with Dr. Persaud for US results\par RTO in 3-4 weeks

## 2023-06-01 NOTE — ASSESSMENT
[FreeTextEntry1] : 5/11 took culture and - betadine adapatax drawtex and xtrasorb unna\par going for us on 5/25 \par 32 ankle left/ right\par 4/20 Not making much progress with gential violet\par will start antibiotics amoxicillin reordered\par \par 3/23/23\par 87 yr w with  cellulitis and overload htn afib now on xarelto- repeat gentian blue again today\par trial augmentin\par no bleeding,   wound to the right lower extremity.  some progress , different area on leg each time\par lymphedema pump \par alginate extrasorb  gentian blue , too wet for a skin sub\par  extrasorb/  Swelling noted to the extremity.  \par EVERETTE/PVR: and venous reflux done and results discussed with patient.  wnl- needs repeat this year\par Edema noted no clinical sign of infection\par Recommendation:\par Apply lidocaine or topical anesthetic.\par Wash wound with ----0.9% saline/\par Apply ---foam/ Kerramax dynaflex extrasorbankle right and left 28.5\par Apply --- compression stocking \par Change dressing ---daily\par Leg elevation as tolerated\par Encouraged ambulation or exercise.\par Optimization of nutrition.\par Offloading to the wound site.\par not using lymphedema pump\par no recent vascular studies\par measured for compression garments\par  \par Nursing orders given\par Follow up in 2 weeks \par Patient using lymphedema pump 2x's per day\par   by nurse with Xtrasorb and unna boot, dressing was fully saturated\par  and skin was moist due to drainage.\par \par  \par RLE erythema and warmth up to knee.  Pt says started yesterday.  \par Pt still using pump 2x/day 20min each\par wounds still draining copious amounts\par On exam, leg with slightly more edema.  Warm and erythematous.\par   Wounds clean with red wound bed but tender on exam.  Cx taken.  \par No iodor fluctuance or crepitus\par  \par Venous studies ordered\par Nursing orders given\par Follow up in 2 weeks\par \par 12/08/2022\par right calf wounds drying up, less drainage using pump\par only mechanical debridement today\par On exam, leg with slightly more edema.  Warm and erythematous. patient on toresamide\par  paint betadine, Adaptic, Xtrasorb, dynaflex\par Venous studies ordered, was suppose to get it done on 12/07/2022, was canceled due to facility not being able to do the  dressing change , encouraged to make the appointment\par Nursing orders given\par Follow up in 2 weeks\par \par pt was positioned with team to expose the wound ( irvin)The patient was positioned to allow for optimization of imaging.\par   with room lights dimmed  The fluorescence imaging device was placed 8-12 cm from the patient and the clinical darkness required for imaging was obtained  \par The [employer] i:X fluorescence imaging device was used to report presence and location   red and cyan on edge fluorescence, indicative of bacteria at loads greater than 104 CFU/g in real-time. Images reported to have  fluorescence. The wound was -mechanically cleansed with Dakins 0.125% e) and/or underwent excisional debridement.  Fluorescence images acquired after cleansing demonstrated reduction in bacteria.\par  \par cleansed with bacteriocidal :dasakins\par  debridement  : 1 mm deeper into sub cut\par \par 1/31/23\par Patient presents with right calf wound with a large amount of drainage. Patient using methylene blue to paint wounds. Patient denies pain at this time. Unna boot being changed three times a week by home care nurse.\par mechanical debridement with Vashe\par Antibiotics ordered\par Adaptic/Exu-dry/multilayer dressing today\par Nursing orders given\par Follow up in 2-3 weeks\par \par 04/20/2023\par Patient presents with right calf wound with a large amount of drainage. Patient using methylene blue to paint wounds. Patient denies pain at this time. Unna boot being changed three times a week by home care nurse.\par mechanical debridement with Vashe\par Antibiotics ordered\par Adaptic/ drawtex, Exu-dry/multilayer dressing today, stop violet\par Nursing orders given\par Follow up in 2-3 weeks\par \par 6/1/23\par Patient presents for follow up of  venous leg ulcers and lymphedema.\par Patient reports increased drainage, unna boot soaked \par Wounds covered in biofilm, thick slough and fibrinous tissue\par s/p excisional debridement\par Wounds washed with CHG\par Acetic acid wash\par Painted with Betadine\par Zinc to periwound area\par Xtrasorb/Multilayer compression dressing\par Continue Lymphedema pump\par Order TWO

## 2023-06-01 NOTE — PHYSICAL EXAM
[JVD] : no jugular venous distention  [Normal Breath Sounds] : Normal breath sounds [0] : left 0 [Ankle Swelling (On Exam)] : present [Ankle Swelling On The Right] : of the right ankle [Varicose Veins Of Lower Extremities] : not present [Varicose Veins Of The Right Leg] : of the right leg [] : of the right leg [Ankle Swelling Bilaterally] : severe [Skin Ulcer] : ulcer [Alert] : alert [Oriented to Person] : oriented to person [Oriented to Place] : oriented to place [Oriented to Time] : oriented to time [Calm] : calm [de-identified] : Elderly AA female  [de-identified] : WNL [de-identified] : non labored [FreeTextEntry1] : No overt ischemia right leg [de-identified] : conversant [de-identified] : limited ambulation [Please See PDF for [employer] Analytics] : Please See PDF for [employer].

## 2023-06-16 DIAGNOSIS — I87.312 CHRONIC VENOUS HYPERTENSION (IDIOPATHIC) WITH ULCER OF LEFT LOWER EXTREMITY: ICD-10-CM

## 2023-06-16 DIAGNOSIS — L97.929 NON-PRESSURE CHRONIC ULCER OF UNSPECIFIED PART OF LEFT LOWER LEG WITH UNSPECIFIED SEVERITY: ICD-10-CM

## 2023-06-16 DIAGNOSIS — I89.0 LYMPHEDEMA, NOT ELSEWHERE CLASSIFIED: ICD-10-CM

## 2023-07-06 ENCOUNTER — APPOINTMENT (OUTPATIENT)
Dept: WOUND CARE | Facility: CLINIC | Age: 88
End: 2023-07-06
Payer: MEDICARE

## 2023-07-06 ENCOUNTER — OUTPATIENT (OUTPATIENT)
Dept: OUTPATIENT SERVICES | Facility: HOSPITAL | Age: 88
LOS: 1 days | End: 2023-07-06
Payer: MEDICARE

## 2023-07-06 PROCEDURE — G0463: CPT

## 2023-07-06 PROCEDURE — 99213 OFFICE O/P EST LOW 20 MIN: CPT

## 2023-07-06 NOTE — HISTORY OF PRESENT ILLNESS
[FreeTextEntry1] : new ulcer, draining a lot  no fever , open wound latr and medial\par took antibiotics ,  , had stopped blue gentian\par  using extrasorb some progress used gentian violet last visit some improvement\par no fevers\par using the gentian violtet\par  \par VN reports that wound of right leg has closed in1/20/21Wounds healed, dry and scabbed, just received lymphedema pump and has been using, using aquaphor for dry skin\par Moisturizes daily - additional products advised other than coca butter currently in use\par VN will measure for a new compression stocking\par Is compliant with diuretic\par no reflux on testing nl everette has pump and overload had closed last visit\par 11/20/2020New blister appeared on leg 11/16/2020. Patient states she has been unable to wear her compression stockings as she is unable to put them on by herself. \par Patient underwent evaluation for peripheral arterial disease using a [employer] diagnostic machine.  Ankle brachial index was obtained using blood pressure cuffs of the ankle and brachial segments of both legs.  A distal pulse volume recording was noted digitally on the device.  The procedure was supervised and interpreted by the physician.  EVERETTE of the right lower extremity PAD.   EVERETTE of the left lower extremity 0.87.  Waveform noted to be  biphasic.   Patient tolerated procedure well in the office.  Results discussed with the patient.\par 2/11/22\par Right leg venous ulcer , drainage is copious, only aquacel over, wound pink, malodorous, culture obtained, no compression on leg, has a nurse\par 9/29/22 Did not obtain CS, referral again given for Derm eval\par \par \par \par \par \par

## 2023-07-06 NOTE — ASSESSMENT
[FreeTextEntry1] : 5/11 took culture and - betadine adapatax drawtex and xtrasorb unna\par going for us on 5/25 \par 32 ankle left/ right\par 4/20 Not making much progress with gential violet\par will start antibiotics amoxicillin reordered\par \par 3/23/23\par 87 yr w with  cellulitis and overload htn afib now on xarelto- repeat gentian blue again today\par trial augmentin\par no bleeding,   wound to the right lower extremity.  some progress , different area on leg each time\par lymphedema pump \par alginate extrasorb  gentian blue , too wet for a skin sub\par  extrasorb/  Swelling noted to the extremity.  \par EVERETTE/PVR: and venous reflux done and results discussed with patient.  wnl- needs repeat this year\par Edema noted no clinical sign of infection\par Recommendation:\par Apply lidocaine or topical anesthetic.\par Wash wound with ----0.9% saline/\par Apply ---foam/ Kerramax dynaflex extrasorbankle right and left 28.5\par Apply --- compression stocking \par Change dressing ---daily\par Leg elevation as tolerated\par Encouraged ambulation or exercise.\par Optimization of nutrition.\par Offloading to the wound site.\par not using lymphedema pump\par no recent vascular studies\par measured for compression garments\par  \par Nursing orders given\par Follow up in 2 weeks \par Patient using lymphedema pump 2x's per day\par   by nurse with Xtrasorb and unna boot, dressing was fully saturated\par  and skin was moist due to drainage.\par \par  \par RLE erythema and warmth up to knee.  Pt says started yesterday.  \par Pt still using pump 2x/day 20min each\par wounds still draining copious amounts\par On exam, leg with slightly more edema.  Warm and erythematous.\par   Wounds clean with red wound bed but tender on exam.  Cx taken.  \par No iodor fluctuance or crepitus\par  \par Venous studies ordered\par Nursing orders given\par Follow up in 2 weeks\par \par 12/08/2022\par right calf wounds drying up, less drainage using pump\par only mechanical debridement today\par On exam, leg with slightly more edema.  Warm and erythematous. patient on toresamide\par  paint betadine, Adaptic, Xtrasorb, dynaflex\par Venous studies ordered, was suppose to get it done on 12/07/2022, was canceled due to facility not being able to do the  dressing change , encouraged to make the appointment\par Nursing orders given\par Follow up in 2 weeks\par \par pt was positioned with team to expose the wound ( irvin)The patient was positioned to allow for optimization of imaging.\par   with room lights dimmed  The fluorescence imaging device was placed 8-12 cm from the patient and the clinical darkness required for imaging was obtained  \par The [employer] i:X fluorescence imaging device was used to report presence and location   red and cyan on edge fluorescence, indicative of bacteria at loads greater than 104 CFU/g in real-time. Images reported to have  fluorescence. The wound was -mechanically cleansed with Dakins 0.125% e) and/or underwent excisional debridement.  Fluorescence images acquired after cleansing demonstrated reduction in bacteria.\par  \par cleansed with bacteriocidal :dasakins\par  debridement  : 1 mm deeper into sub cut\par \par 1/31/23\par Patient presents with right calf wound with a large amount of drainage. Patient using methylene blue to paint wounds. Patient denies pain at this time. Unna boot being changed three times a week by home care nurse.\par mechanical debridement with Vashe\par Antibiotics ordered\par Adaptic/Exu-dry/multilayer dressing today\par Nursing orders given\par Follow up in 2-3 weeks\par \par 04/20/2023\par Patient presents with right calf wound with a large amount of drainage. Patient using methylene blue to paint wounds. Patient denies pain at this time. Unna boot being changed three times a week by home care nurse.\par mechanical debridement with Vashe\par Antibiotics ordered\par Adaptic/ drawtex, Exu-dry/multilayer dressing today, stop violet\par Nursing orders given\par Follow up in 2-3 weeks\par \par 6/1/23\par Patient presents for follow up of  venous leg ulcers and lymphedema.\par Patient reports increased drainage, unna boot soaked \par Wounds covered in biofilm, thick slough and fibrinous tissue\par s/p excisional debridement\par Wounds washed with CHG\par Acetic acid wash\par Painted with Betadine\par Zinc to periwound area\par Xtrasorb/Multilayer compression dressing\par Continue Lymphedema pump\par Order TWO\par \par 07/06/2023\par Patient presents for follow up of  venous leg ulcers and lymphedema.\par Patient reports decreased  drainage, \par Wounds improving \par s/p excisional debridement\par Wounds washed with CHG\par Acetic acid wash\par Painted with Betadine\par Zinc to periwound area\par Xtrasorb/Multilayer compression dressing\par Continue Lymphedema pump\par \par

## 2023-07-06 NOTE — PHYSICAL EXAM
[JVD] : no jugular venous distention  [Normal Breath Sounds] : Normal breath sounds [0] : left 0 [Ankle Swelling (On Exam)] : present [Ankle Swelling On The Right] : of the right ankle [Varicose Veins Of Lower Extremities] : not present [Varicose Veins Of The Right Leg] : of the right leg [] : of the right leg [Ankle Swelling Bilaterally] : severe [Skin Ulcer] : ulcer [Alert] : alert [Oriented to Person] : oriented to person [Oriented to Place] : oriented to place [Oriented to Time] : oriented to time [Calm] : calm [de-identified] : Elderly AA female  [de-identified] : WNL [de-identified] : non labored [FreeTextEntry1] : No overt ischemia right leg [de-identified] : limited ambulation [de-identified] : conversant [Please See PDF for Tissue Analytics] : Please See PDF for [employer] Analytics.

## 2023-07-06 NOTE — PLAN
[FreeTextEntry1] : \par Antibiotics dis not help, culture taken\par Wash with Dakin's, pat dry,apply moisture barrier cream at the ankle to prevent maceration,  apply Adaptic to the wound bed and cover with alginate super absorber and multilayer dressing\par Change three times a week\par Follow up in 2-3 weeks\par \par 5/11/23\par Plan:\par wash wound soap and water\par Pain with Betadine\par Multiple Superabsorber/Multilayer layer compression dressing\par Change 3 times a week.\par \par 6/1/23\par Plan:\par Wash wounds with soap and water\par Acetic acid rinse\par Paint with Betadine\par Zinc to periwound area\par Xtrasorb/Multilayer compression dressing\par Change 3 times a week\par Continue Lymphedema pump\par Order TWO\par Nursing orders given\par Follow up with Dr. Persaud for US results\par RTO in 3-4 weeks\par \par 07/06/2023\par Plan:\par Wash wounds with soap and water\par Acetic acid rinse\par Paint with Betadine\par Zinc to periwound area\par Xtrasorb/Multilayer compression dressing\par Change 3 times a week\par Continue Lymphedema pump\par Nursing orders given\par RTO in 3-4 weeks

## 2023-07-25 DIAGNOSIS — L97.929 NON-PRESSURE CHRONIC ULCER OF UNSPECIFIED PART OF LEFT LOWER LEG WITH UNSPECIFIED SEVERITY: ICD-10-CM

## 2023-07-25 DIAGNOSIS — I87.312 CHRONIC VENOUS HYPERTENSION (IDIOPATHIC) WITH ULCER OF LEFT LOWER EXTREMITY: ICD-10-CM

## 2023-07-25 DIAGNOSIS — I87.2 VENOUS INSUFFICIENCY (CHRONIC) (PERIPHERAL): ICD-10-CM

## 2023-08-03 ENCOUNTER — APPOINTMENT (OUTPATIENT)
Dept: WOUND CARE | Facility: CLINIC | Age: 88
End: 2023-08-03
Payer: MEDICARE

## 2023-08-03 ENCOUNTER — OUTPATIENT (OUTPATIENT)
Dept: OUTPATIENT SERVICES | Facility: HOSPITAL | Age: 88
LOS: 1 days | End: 2023-08-03
Payer: MEDICARE

## 2023-08-03 VITALS
TEMPERATURE: 97.1 F | HEART RATE: 69 BPM | RESPIRATION RATE: 18 BRPM | DIASTOLIC BLOOD PRESSURE: 91 MMHG | SYSTOLIC BLOOD PRESSURE: 150 MMHG | OXYGEN SATURATION: 100 %

## 2023-08-03 LAB — BACTERIA SPEC CULT: ABNORMAL

## 2023-08-03 PROCEDURE — 11042 DBRDMT SUBQ TIS 1ST 20SQCM/<: CPT

## 2023-08-03 PROCEDURE — G0463: CPT | Mod: 25

## 2023-08-03 PROCEDURE — 99213 OFFICE O/P EST LOW 20 MIN: CPT | Mod: 25

## 2023-08-03 RX ORDER — FENUGREEK SEED/BL.THISTLE/ANIS 340 MG
CAPSULE ORAL
Qty: 30 | Refills: 0 | Status: ACTIVE | COMMUNITY
Start: 2023-03-23 | End: 1900-01-01

## 2023-08-20 NOTE — PHYSICAL EXAM
[Normal Breath Sounds] : Normal breath sounds [0] : left 0 [Ankle Swelling (On Exam)] : present [Ankle Swelling On The Right] : of the right ankle [Varicose Veins Of The Right Leg] : of the right leg [] : of the right leg [Ankle Swelling Bilaterally] : severe [Skin Ulcer] : ulcer [Alert] : alert [Oriented to Person] : oriented to person [Oriented to Place] : oriented to place [Oriented to Time] : oriented to time [Calm] : calm [Please See PDF for Tissue Analytics] : Please See PDF for [employer]. [JVD] : no jugular venous distention  [de-identified] : Elderly AA female  [Varicose Veins Of Lower Extremities] : not present [de-identified] : WNL [de-identified] : non labored [de-identified] : limited ambulation [FreeTextEntry1] : No overt ischemia right leg [de-identified] : conversant

## 2023-08-20 NOTE — ASSESSMENT
[FreeTextEntry1] : 5/11 took culture and - betadine adapatax drawtex and xtrasorb unna going for us on 5/25  32 ankle left/ right 4/20 Not making much progress with gential violet will start antibiotics amoxicillin reordered  3/23/23 87 yr w with  cellulitis and overload htn afib now on xarelto- repeat gentian blue again today trial augmentin no bleeding,   wound to the right lower extremity.  some progress , different area on leg each time lymphedema pump  alginate extrasorb  gentian blue , too wet for a skin sub  extrasorb/  Swelling noted to the extremity.   EVERETTE/PVR: and venous reflux done and results discussed with patient.  wnl- needs repeat this year Edema noted no clinical sign of infection Recommendation: Apply lidocaine or topical anesthetic. Wash wound with ----0.9% saline/ Apply ---foam/ Kerramax dynaflex extrasorbankle right and left 28.5 Apply --- compression stocking  Change dressing ---daily Leg elevation as tolerated Encouraged ambulation or exercise. Optimization of nutrition. Offloading to the wound site. not using lymphedema pump no recent vascular studies measured for compression garments   Nursing orders given Follow up in 2 weeks  Patient using lymphedema pump 2x's per day   by nurse with Xtrasorb and unna boot, dressing was fully saturated  and skin was moist due to drainage.    RLE erythema and warmth up to knee.  Pt says started yesterday.   Pt still using pump 2x/day 20min each wounds still draining copious amounts On exam, leg with slightly more edema.  Warm and erythematous.   Wounds clean with red wound bed but tender on exam.  Cx taken.   No iodor fluctuance or crepitus   Venous studies ordered Nursing orders given Follow up in 2 weeks  12/08/2022 right calf wounds drying up, less drainage using pump only mechanical debridement today On exam, leg with slightly more edema.  Warm and erythematous. patient on toresamide  paint betadine, Adaptic, Xtrasorb, dynaflex Venous studies ordered, was suppose to get it done on 12/07/2022, was canceled due to facility not being able to do the  dressing change , encouraged to make the appointment Nursing orders given Follow up in 2 weeks  pt was positioned with team to expose the wound ( irvin)The patient was positioned to allow for optimization of imaging.   with room lights dimmed  The fluorescence imaging device was placed 8-12 cm from the patient and the clinical darkness required for imaging was obtained   The [employer] i:X fluorescence imaging device was used to report presence and location   red and cyan on edge fluorescence, indicative of bacteria at loads greater than 104 CFU/g in real-time. Images reported to have  fluorescence. The wound was -mechanically cleansed with Dakins 0.125% e) and/or underwent excisional debridement.  Fluorescence images acquired after cleansing demonstrated reduction in bacteria.   cleansed with bacteriocidal :dasakins  debridement  : 1 mm deeper into sub cut  1/31/23 Patient presents with right calf wound with a large amount of drainage. Patient using methylene blue to paint wounds. Patient denies pain at this time. Unna boot being changed three times a week by home care nurse. mechanical debridement with Vashe Antibiotics ordered Adaptic/Exu-dry/multilayer dressing today Nursing orders given Follow up in 2-3 weeks  04/20/2023 Patient presents with right calf wound with a large amount of drainage. Patient using methylene blue to paint wounds. Patient denies pain at this time. Unna boot being changed three times a week by home care nurse. mechanical debridement with Vashe Antibiotics ordered Adaptic/ drawtex, Exu-dry/multilayer dressing today, stop kian Nursing orders given Follow up in 2-3 weeks  6/1/23 Patient presents for follow up of  venous leg ulcers and lymphedema. Patient reports increased drainage, unna boot soaked  Wounds covered in biofilm, thick slough and fibrinous tissue s/p excisional debridement Wounds washed with CHG Acetic acid wash Painted with Betadine Zinc to periwound area Xtrasorb/Multilayer compression dressing Continue Lymphedema pump Order TWO  07/06/2023 Patient presents for follow up of  venous leg ulcers and lymphedema. Patient reports decreased  drainage,  Wounds improving  s/p excisional debridement Wounds washed with CHG Acetic acid wash Painted with Betadine Zinc to periwound area Xtrasorb/Multilayer compression dressing Continue Lymphedema pump  08/03/2023 Patient presents for follow up of  venous leg ulcers and lymphedema. Patient reports increased drainage, odor s/p mechanical debridement Wounds washed with CHG Acetic acid wash Painted with Betadine Zinc to periwound area Xtrasorb/Multilayer compression dressing Continue Lymphedema pump Cipro for 7 days prescribed with pribiotics TWO2 boot discussed, will order Return in 3 weeks

## 2023-08-20 NOTE — HISTORY OF PRESENT ILLNESS
[FreeTextEntry1] : no fever still draining on right   ulcer, draining   no fever , open wound latr and medial took antibiotics ,  , had stopped blue gentian  using extrasorb some progress used gentian violet last visit some improvement no fevers using the gentian violtet   VN reports that wound of right leg has closed in1/20/21Wounds healed, dry and scabbed, just received lymphedema pump and has been using, using aquaphor for dry skin Moisturizes daily - additional products advised other than coca butter currently in use VN will measure for a new compression stocking Is compliant with diuretic no reflux on testing nl everette has pump and overload had closed last visit 11/20/2020New blister appeared on leg 11/16/2020. Patient states she has been unable to wear her compression stockings as she is unable to put them on by herself.  Patient underwent evaluation for peripheral arterial disease using a [email or web address] diagnostic machine.  Ankle brachial index was obtained using blood pressure cuffs of the ankle and brachial segments of both legs.  A distal pulse volume recording was noted digitally on the device.  The procedure was supervised and interpreted by the physician.  EVERETTE of the right lower extremity PAD.   EVERETTE of the left lower extremity 0.87.  Waveform noted to be  biphasic.   Patient tolerated procedure well in the office.  Results discussed with the patient. 2/11/22 Right leg venous ulcer , drainage is copious, only aquacel over, wound pink, malodorous, culture obtained, no compression on leg, has a nurse 9/29/22 Did not obtain CS, referral again given for Derm eval

## 2023-08-24 ENCOUNTER — APPOINTMENT (OUTPATIENT)
Dept: WOUND CARE | Facility: CLINIC | Age: 88
End: 2023-08-24
Payer: MEDICARE

## 2023-08-24 ENCOUNTER — OUTPATIENT (OUTPATIENT)
Dept: OUTPATIENT SERVICES | Facility: HOSPITAL | Age: 88
LOS: 1 days | End: 2023-08-24
Payer: MEDICARE

## 2023-08-24 VITALS
SYSTOLIC BLOOD PRESSURE: 158 MMHG | HEART RATE: 78 BPM | TEMPERATURE: 97.2 F | WEIGHT: 205 LBS | DIASTOLIC BLOOD PRESSURE: 92 MMHG | BODY MASS INDEX: 32.18 KG/M2 | RESPIRATION RATE: 17 BRPM | HEIGHT: 67 IN | OXYGEN SATURATION: 100 %

## 2023-08-24 PROCEDURE — 11042 DBRDMT SUBQ TIS 1ST 20SQCM/<: CPT

## 2023-08-24 PROCEDURE — G0463: CPT | Mod: 25

## 2023-08-24 PROCEDURE — 99213 OFFICE O/P EST LOW 20 MIN: CPT | Mod: 25

## 2023-08-24 RX ORDER — LACTOBACILLUS ACIDOPHILUS 0.5 MG
0.5 TABLET ORAL
Qty: 30 | Refills: 0 | Status: ACTIVE | COMMUNITY
Start: 2023-08-03

## 2023-08-24 NOTE — HISTORY OF PRESENT ILLNESS
[FreeTextEntry1] : no fever still draining on right   ulcer, draining   no fever , open wound latr and medial took antibiotics ,  , had stopped blue gentian  using extrasorb some progress used gentian violet last visit some improvement no fevers using the gentian violtet   VN reports that wound of right leg has closed in1/20/21Wounds healed, dry and scabbed, just received lymphedema pump and has been using, using aquaphor for dry skin Moisturizes daily - additional products advised other than coca butter currently in use VN will measure for a new compression stocking Is compliant with diuretic no reflux on testing nl everette has pump and overload had closed last visit 11/20/2020New blister appeared on leg 11/16/2020. Patient states she has been unable to wear her compression stockings as she is unable to put them on by herself.  Patient underwent evaluation for peripheral arterial disease using a [employer] diagnostic machine.  Ankle brachial index was obtained using blood pressure cuffs of the ankle and brachial segments of both legs.  A distal pulse volume recording was noted digitally on the device.  The procedure was supervised and interpreted by the physician.  EVERETTE of the right lower extremity PAD.   EVERETTE of the left lower extremity 0.87.  Waveform noted to be  biphasic.   Patient tolerated procedure well in the office.  Results discussed with the patient. 2/11/22 Right leg venous ulcer , drainage is copious, only aquacel over, wound pink, malodorous, culture obtained, no compression on leg, has a nurse 9/29/22 Did not obtain CS, referral again given for Derm eval

## 2023-08-24 NOTE — PHYSICAL EXAM
[JVD] : no jugular venous distention  [Normal Breath Sounds] : Normal breath sounds [0] : left 0 [Ankle Swelling (On Exam)] : present [Ankle Swelling On The Right] : of the right ankle [Varicose Veins Of Lower Extremities] : not present [Varicose Veins Of The Right Leg] : of the right leg [] : of the right leg [Ankle Swelling Bilaterally] : severe [Skin Ulcer] : ulcer [Alert] : alert [Oriented to Person] : oriented to person [Oriented to Place] : oriented to place [Oriented to Time] : oriented to time [Calm] : calm [de-identified] : Elderly AA female  [de-identified] : WNL [de-identified] : non labored [FreeTextEntry1] : No overt ischemia right leg [de-identified] : limited ambulation [de-identified] : conversant [Please See PDF for Tissue Analytics] : Please See PDF for [employer] Analytics.

## 2023-08-24 NOTE — ASSESSMENT
[FreeTextEntry1] : 5/11 took culture and - betadine adapatax drawtex and xtrasorb unna going for us on 5/25  33 ankle left/ right 4/20 Not making much progress with gential violet will start antibiotics amoxicillin reordered  3/23/23 87 yr w with  cellulitis and overload htn afib now on xarelto- repeat gentian blue again today trial augmentin no bleeding,   wound to the right lower extremity.  some progress , different area on leg each time lymphedema pump  alginate extrasorb  gentian blue , too wet for a skin sub  extrasorb/  Swelling noted to the extremity.   EVERETTE/PVR: and venous reflux done and results discussed with patient.  wnl- needs repeat this year Edema noted no clinical sign of infection Recommendation: Apply lidocaine or topical anesthetic. Wash wound with ----0.9% saline/ Apply ---foam/ Kerramax dynaflex extrasorbankle right and left 28.5 Apply --- compression stocking  Change dressing ---daily Leg elevation as tolerated Encouraged ambulation or exercise. Optimization of nutrition. Offloading to the wound site. not using lymphedema pump no recent vascular studies measured for compression garments   Nursing orders given Follow up in 2 weeks  Patient using lymphedema pump 2x's per day   by nurse with Xtrasorb and unna boot, dressing was fully saturated  and skin was moist due to drainage.    RLE erythema and warmth up to knee.  Pt says started yesterday.   Pt still using pump 2x/day 20min each wounds still draining copious amounts On exam, leg with slightly more edema.  Warm and erythematous.   Wounds clean with red wound bed but tender on exam.  Cx taken.   No iodor fluctuance or crepitus   Venous studies ordered Nursing orders given Follow up in 2 weeks  12/08/2022 right calf wounds drying up, less drainage using pump only mechanical debridement today On exam, leg with slightly more edema.  Warm and erythematous. patient on toresamide  paint betadine, Adaptic, Xtrasorb, dynaflex Venous studies ordered, was suppose to get it done on 12/07/2022, was canceled due to facility not being able to do the  dressing change , encouraged to make the appointment Nursing orders given Follow up in 2 weeks  pt was positioned with team to expose the wound ( irvin)The patient was positioned to allow for optimization of imaging.   with room lights dimmed  The fluorescence imaging device was placed 8-12 cm from the patient and the clinical darkness required for imaging was obtained   The [employer] i:X fluorescence imaging device was used to report presence and location   red and cyan on edge fluorescence, indicative of bacteria at loads greater than 104 CFU/g in real-time. Images reported to have  fluorescence. The wound was -mechanically cleansed with Dakins 0.125% e) and/or underwent excisional debridement.  Fluorescence images acquired after cleansing demonstrated reduction in bacteria.   cleansed with bacteriocidal :dasakins  debridement  : 1 mm deeper into sub cut  1/31/23 Patient presents with right calf wound with a large amount of drainage. Patient using methylene blue to paint wounds. Patient denies pain at this time. Unna boot being changed three times a week by home care nurse. mechanical debridement with Vashe Antibiotics ordered Adaptic/Exu-dry/multilayer dressing today Nursing orders given Follow up in 2-3 weeks  04/20/2023 Patient presents with right calf wound with a large amount of drainage. Patient using methylene blue to paint wounds. Patient denies pain at this time. Unna boot being changed three times a week by home care nurse. mechanical debridement with Vashe Antibiotics ordered Adaptic/ drawtex, Exu-dry/multilayer dressing today, stop kian Nursing orders given Follow up in 2-3 weeks  6/1/23 Patient presents for follow up of  venous leg ulcers and lymphedema. Patient reports increased drainage, unna boot soaked  Wounds covered in biofilm, thick slough and fibrinous tissue s/p excisional debridement Wounds washed with CHG Acetic acid wash Painted with Betadine Zinc to periwound area Xtrasorb/Multilayer compression dressing Continue Lymphedema pump Order TWO  07/06/2023 Patient presents for follow up of  venous leg ulcers and lymphedema. Patient reports decreased  drainage,  Wounds improving  s/p excisional debridement Wounds washed with CHG Acetic acid wash Painted with Betadine Zinc to periwound area Xtrasorb/Multilayer compression dressing Continue Lymphedema pump  08/03/2023 Patient presents for follow up of  venous leg ulcers and lymphedema. Patient reports increased drainage, odor s/p mechanical debridement Wounds washed with CHG Acetic acid wash Painted with Betadine Zinc to periwound area Xtrasorb/Multilayer compression dressing Continue Lymphedema pump Cipro for 7 days prescribed with pribiotics TWO2 boot discussed, will order Return in 3 weeks  8/24 no two boots not qualify betadine working, drier off antibiotics  Patient presents for follow up of  venous leg ulcers and lymphedema.   sharpdebridement inc depth 1 mm an lateral leg right wounds  Wounds washed with CHG Acetic acid wash Painted with Betadine Zinc to periwound area Xtrasorb/Multilayer compression dressing Continue Lymphedema pump s/p Cipro for 7 days prescribed with pribiotics   Return in 3 weeks

## 2023-09-07 DIAGNOSIS — S80.821A BLISTER (NONTHERMAL), RIGHT LOWER LEG, INITIAL ENCOUNTER: ICD-10-CM

## 2023-09-07 DIAGNOSIS — I48.91 UNSPECIFIED ATRIAL FIBRILLATION: ICD-10-CM

## 2023-09-07 DIAGNOSIS — L03.119 CELLULITIS OF UNSPECIFIED PART OF LIMB: ICD-10-CM

## 2023-09-14 ENCOUNTER — APPOINTMENT (OUTPATIENT)
Dept: WOUND CARE | Facility: HOSPITAL | Age: 88
End: 2023-09-14
Payer: MEDICARE

## 2023-09-14 VITALS
SYSTOLIC BLOOD PRESSURE: 155 MMHG | HEART RATE: 74 BPM | TEMPERATURE: 96.7 F | OXYGEN SATURATION: 98 % | BODY MASS INDEX: 32.18 KG/M2 | WEIGHT: 205 LBS | RESPIRATION RATE: 18 BRPM | DIASTOLIC BLOOD PRESSURE: 88 MMHG | HEIGHT: 67 IN

## 2023-09-14 DIAGNOSIS — L03.119 CELLULITIS OF UNSPECIFIED PART OF LIMB: ICD-10-CM

## 2023-09-14 DIAGNOSIS — L97.929 NON-PRESSURE CHRONIC ULCER OF UNSPECIFIED PART OF LEFT LOWER LEG WITH UNSPECIFIED SEVERITY: ICD-10-CM

## 2023-09-14 DIAGNOSIS — I87.312 CHRONIC VENOUS HYPERTENSION (IDIOPATHIC) WITH ULCER OF LEFT LOWER EXTREMITY: ICD-10-CM

## 2023-09-14 DIAGNOSIS — I89.0 LYMPHEDEMA, NOT ELSEWHERE CLASSIFIED: ICD-10-CM

## 2023-09-14 PROCEDURE — 99213 OFFICE O/P EST LOW 20 MIN: CPT

## 2023-09-21 PROBLEM — L03.119 CELLULITIS OF EXTREMITY: Status: ACTIVE | Noted: 2018-01-19

## 2023-10-26 ENCOUNTER — OUTPATIENT (OUTPATIENT)
Dept: OUTPATIENT SERVICES | Facility: HOSPITAL | Age: 88
LOS: 1 days | End: 2023-10-26
Payer: MEDICARE

## 2023-10-26 ENCOUNTER — APPOINTMENT (OUTPATIENT)
Dept: WOUND CARE | Facility: HOSPITAL | Age: 88
End: 2023-10-26
Payer: MEDICARE

## 2023-10-26 VITALS
BODY MASS INDEX: 33.67 KG/M2 | TEMPERATURE: 97.3 F | WEIGHT: 215 LBS | HEART RATE: 86 BPM | OXYGEN SATURATION: 95 % | SYSTOLIC BLOOD PRESSURE: 132 MMHG | DIASTOLIC BLOOD PRESSURE: 87 MMHG

## 2023-10-26 DIAGNOSIS — I87.2 VENOUS INSUFFICIENCY (CHRONIC) (PERIPHERAL): ICD-10-CM

## 2023-10-26 PROCEDURE — G0463: CPT

## 2023-10-26 PROCEDURE — 99214 OFFICE O/P EST MOD 30 MIN: CPT

## 2023-10-26 RX ORDER — CIPROFLOXACIN HYDROCHLORIDE 500 MG/1
500 TABLET, FILM COATED ORAL TWICE DAILY
Qty: 14 | Refills: 0 | Status: DISCONTINUED | COMMUNITY
Start: 2023-08-03 | End: 2023-10-26

## 2023-10-26 RX ORDER — HYDROCORTISONE 1 %
12 CREAM (GRAM) TOPICAL TWICE DAILY
Qty: 1 | Refills: 1 | Status: ACTIVE | OUTPATIENT
Start: 2023-10-26

## 2023-10-26 RX ORDER — AMOXICILLIN AND CLAVULANATE POTASSIUM 875; 125 MG/1; MG/1
875-125 TABLET, COATED ORAL
Qty: 14 | Refills: 1 | Status: DISCONTINUED | COMMUNITY
Start: 2023-03-23 | End: 2023-10-26

## 2023-11-06 DIAGNOSIS — I87.2 VENOUS INSUFFICIENCY (CHRONIC) (PERIPHERAL): ICD-10-CM

## 2023-11-06 DIAGNOSIS — I89.0 LYMPHEDEMA, NOT ELSEWHERE CLASSIFIED: ICD-10-CM

## 2023-11-16 ENCOUNTER — APPOINTMENT (OUTPATIENT)
Dept: WOUND CARE | Facility: HOSPITAL | Age: 88
End: 2023-11-16
Payer: MEDICARE

## 2023-11-16 VITALS
RESPIRATION RATE: 20 BRPM | HEIGHT: 67 IN | WEIGHT: 215 LBS | HEART RATE: 77 BPM | SYSTOLIC BLOOD PRESSURE: 165 MMHG | TEMPERATURE: 97.8 F | OXYGEN SATURATION: 100 % | DIASTOLIC BLOOD PRESSURE: 78 MMHG | BODY MASS INDEX: 33.74 KG/M2

## 2023-11-16 DIAGNOSIS — I87.312 CHRONIC VENOUS HYPERTENSION (IDIOPATHIC) WITH ULCER OF LEFT LOWER EXTREMITY: ICD-10-CM

## 2023-11-16 DIAGNOSIS — L97.929 CHRONIC VENOUS HYPERTENSION (IDIOPATHIC) WITH ULCER OF LEFT LOWER EXTREMITY: ICD-10-CM

## 2023-11-16 PROCEDURE — 99213 OFFICE O/P EST LOW 20 MIN: CPT

## 2023-11-16 RX ORDER — AMMONIUM LACTATE 12 %
12 CREAM (GRAM) TOPICAL TWICE DAILY
Qty: 1 | Refills: 3 | Status: ACTIVE | COMMUNITY
Start: 2023-11-16 | End: 1900-01-01

## 2023-11-17 ENCOUNTER — NON-APPOINTMENT (OUTPATIENT)
Age: 88
End: 2023-11-17

## 2023-11-21 PROBLEM — I87.312 CHRONIC VENOUS HYPERTENSION (IDIOPATHIC) WITH ULCER OF LEFT LOWER EXTREMITY: Status: ACTIVE | Noted: 2022-02-11

## 2024-01-04 ENCOUNTER — APPOINTMENT (OUTPATIENT)
Dept: WOUND CARE | Facility: HOSPITAL | Age: 89
End: 2024-01-04
Payer: MEDICARE

## 2024-01-04 ENCOUNTER — OUTPATIENT (OUTPATIENT)
Dept: OUTPATIENT SERVICES | Facility: HOSPITAL | Age: 89
LOS: 1 days | End: 2024-01-04
Payer: MEDICARE

## 2024-01-04 VITALS — HEART RATE: 71 BPM | TEMPERATURE: 97.2 F | DIASTOLIC BLOOD PRESSURE: 82 MMHG | SYSTOLIC BLOOD PRESSURE: 141 MMHG

## 2024-01-04 DIAGNOSIS — S80.821A BLISTER (NONTHERMAL), RIGHT LOWER LEG, INITIAL ENCOUNTER: ICD-10-CM

## 2024-01-04 DIAGNOSIS — I48.91 UNSPECIFIED ATRIAL FIBRILLATION: ICD-10-CM

## 2024-01-04 PROCEDURE — G0463: CPT | Mod: 25

## 2024-01-04 PROCEDURE — 99214 OFFICE O/P EST MOD 30 MIN: CPT | Mod: 25

## 2024-01-04 PROCEDURE — 11042 DBRDMT SUBQ TIS 1ST 20SQCM/<: CPT

## 2024-01-04 NOTE — ASSESSMENT
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   Applied Sween patient dressings /Xtrasorb/large pad/Multilayer dressing Change three times a week.

## 2024-01-04 NOTE — PHYSICAL EXAM
[JVD] : no jugular venous distention  [Normal Breath Sounds] : Normal breath sounds [0] : left 0 [Ankle Swelling (On Exam)] : present [Ankle Swelling On The Right] : of the right ankle [Varicose Veins Of Lower Extremities] : not present [Varicose Veins Of The Right Leg] : of the right leg [] : of the right leg [Ankle Swelling Bilaterally] : severe [Skin Ulcer] : ulcer [Alert] : alert [Oriented to Person] : oriented to person [Oriented to Place] : oriented to place [Oriented to Time] : oriented to time [Calm] : calm [de-identified] : Elderly AA female  [de-identified] : WNL [de-identified] : non labored [FreeTextEntry1] : No overt ischemia right leg [de-identified] : limited ambulation [de-identified] : conversant [Please See PDF for Tissue Analytics] : Please See PDF for [employer] Analytics.

## 2024-01-04 NOTE — PLAN
[FreeTextEntry1] :   Follow up in 2-3 weeks   wash wound soap and water    Continue Lymphedema pump   /Multilayer /circaid compression dressing Change 3 times a week    RTO in 3-4 weeks

## 2024-01-04 NOTE — HISTORY OF PRESENT ILLNESS
[FreeTextEntry1] : no fever / draining on right stopped/  ulcer, draining minimal- now dry with lachydrin    no fever , open wound latr and medial s/p stopped blue gentian/  using extrasorb no fever vna stopped   VN f right leg has closed in1/20/21 lymphedema pump  h/o coca butter currently in use  Is compliant with diuretic no reflux on testing nl everette has pump and overload had closed   11/20/2020New blister appeared on leg 11/16/2020. Patient states she has been unable to wear her compression stockings as she is unable to put them on by herself.  Patient underwent evaluation for peripheral arterial disease using a [employer] diagnostic machine.  Ankle brachial index was obtained using blood pressure cuffs of the ankle and brachial segments of both legs.  A distal pulse volume recording was noted digitally on the device.  The procedure was supervised and interpreted by the physician.  EVERETTE of the right lower extremity PAD.   EVERETTE of the left lower extremity 0.87.  Waveform noted to be  biphasic.   Patient tolerated procedure well in the office.  Results discussed with the patient.

## 2024-01-09 DIAGNOSIS — I87.393 CHRONIC VENOUS HYPERTENSION (IDIOPATHIC) WITH OTHER COMPLICATIONS OF BILATERAL LOWER EXTREMITY: ICD-10-CM

## 2024-01-09 DIAGNOSIS — I89.0 LYMPHEDEMA, NOT ELSEWHERE CLASSIFIED: ICD-10-CM

## 2024-01-09 DIAGNOSIS — I48.91 UNSPECIFIED ATRIAL FIBRILLATION: ICD-10-CM
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
## 2024-01-09 DIAGNOSIS — S80.821A BLISTER (NONTHERMAL), RIGHT LOWER LEG, INITIAL ENCOUNTER: ICD-10-CM

## 2024-01-24 NOTE — CONSULT LETTER
Mychart response sent to patient.   [Consult Letter:] : I had the pleasure of evaluating your patient, [unfilled]. [Please see my note below.] : Please see my note below. [Consult Closing:] : Thank you very much for allowing me to participate in the care of this patient.  If you have any questions, please do not hesitate to contact me. [Sincerely,] : Sincerely,

## 2024-03-05 ENCOUNTER — APPOINTMENT (OUTPATIENT)
Dept: ORTHOPEDIC SURGERY | Facility: CLINIC | Age: 89
End: 2024-03-05

## 2024-04-02 ENCOUNTER — APPOINTMENT (OUTPATIENT)
Dept: WOUND CARE | Facility: HOSPITAL | Age: 89
End: 2024-04-02

## 2024-04-11 ENCOUNTER — OUTPATIENT (OUTPATIENT)
Dept: OUTPATIENT SERVICES | Facility: HOSPITAL | Age: 89
LOS: 1 days | End: 2024-04-11
Payer: MEDICARE

## 2024-04-11 ENCOUNTER — APPOINTMENT (OUTPATIENT)
Dept: WOUND CARE | Facility: HOSPITAL | Age: 89
End: 2024-04-11
Payer: MEDICARE

## 2024-04-11 VITALS
OXYGEN SATURATION: 97 % | DIASTOLIC BLOOD PRESSURE: 74 MMHG | SYSTOLIC BLOOD PRESSURE: 128 MMHG | HEART RATE: 74 BPM | TEMPERATURE: 97.9 F | RESPIRATION RATE: 18 BRPM

## 2024-04-11 DIAGNOSIS — S81.801D UNSPECIFIED OPEN WOUND, RIGHT LOWER LEG, SUBSEQUENT ENCOUNTER: ICD-10-CM

## 2024-04-11 DIAGNOSIS — I87.393 CHRONIC VENOUS HYPERTENSION (IDIOPATHIC) WITH OTHER COMPLICATIONS OF BILATERAL LOWER EXTREMITY: ICD-10-CM

## 2024-04-11 PROCEDURE — 99213 OFFICE O/P EST LOW 20 MIN: CPT

## 2024-04-11 PROCEDURE — G0463: CPT

## 2024-04-11 NOTE — HISTORY OF PRESENT ILLNESS
[FreeTextEntry1] : no fever / draining on right stopped/  ulcer, draining minimal- now dry with lachydrin    no fever , open wound latr and medial s/p stopped blue gentian/  using extrasorb no fever vna stopped   VN f right leg has closed in1/20/21 lymphedema pump  h/o coca butter currently in use  Is compliant with diuretic no reflux on testing nl everette has pump and overload had closed   11/20/2020New blister appeared on leg 11/16/2020. Patient states she has been unable to wear her compression stockings as she is unable to put them on by herself.  Patient underwent evaluation for peripheral arterial disease using a [employer] diagnostic machine.  Ankle brachial index was obtained using blood pressure cuffs of the ankle and brachial segments of both legs.  A distal pulse volume recording was noted digitally on the device.  The procedure was supervised and interpreted by the physician.  EVERETTE of the right lower extremity PAD.   EVERETTE of the left lower extremity 0.87.  Waveform noted to be  biphasic.   Patient tolerated procedure well in the office.  Results discussed with the patient.  4/1124  Patient with chronic lymphedema. Patient is s/p rehab after hospitalization for episode of fluid overload.

## 2024-04-11 NOTE — PHYSICAL EXAM
[JVD] : no jugular venous distention  [Normal Breath Sounds] : Normal breath sounds [0] : left 0 [Ankle Swelling (On Exam)] : present [Ankle Swelling On The Right] : of the right ankle [Varicose Veins Of Lower Extremities] : not present [Varicose Veins Of The Right Leg] : of the right leg [] : of the right leg [Ankle Swelling Bilaterally] : severe [Skin Ulcer] : ulcer [Alert] : alert [Oriented to Person] : oriented to person [Oriented to Place] : oriented to place [Oriented to Time] : oriented to time [Calm] : calm [de-identified] : Elderly AA female  [de-identified] : WNL [de-identified] : non labored [FreeTextEntry1] : No overt ischemia right leg [de-identified] : limited ambulation [de-identified] : conversant [Please See PDF for Tissue Analytics] : Please See PDF for [employer] Analytics.

## 2024-04-11 NOTE — PLAN
[FreeTextEntry1] :   Follow up in 2-3 weeks   wash wound soap and water    Continue Lymphedema pump   /Multilayer /circaid compression dressing Change 3 times a week    RTO in 3-4 weeks  4/11/24 Plan: Continue using Lymphedema pump twice daily Compression garments daily RTO as needed

## 2024-04-11 NOTE — ASSESSMENT
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   Applied Sween patient dressings /Xtrasorb/large pad/Multilayer dressing Change three times a week.  4/11/24 Patient presents for follow up of bilateral lower extremity lymphedema On exam: No open wounds present Fibrotic areas wound gel applied s/p mechanical debridement Continue using lymphedema pump Measured for lymphedema garments

## 2024-04-11 NOTE — REASON FOR VISIT
[Follow-Up: _____] : a [unfilled] follow-up visit [Formal Caregiver] : formal caregiver [FreeTextEntry1] : leg ulcer right

## 2024-04-16 PROBLEM — I87.393 CHRONIC VENOUS HTN W OTH COMP OF BILATERAL LOW EXTRM: Status: ACTIVE | Noted: 2022-09-16

## 2024-04-16 PROBLEM — S81.801D WOUND OF RIGHT LOWER EXTREMITY, SUBSEQUENT ENCOUNTER: Status: ACTIVE | Noted: 2020-12-02

## 2024-04-25 DIAGNOSIS — S81.801D UNSPECIFIED OPEN WOUND, RIGHT LOWER LEG, SUBSEQUENT ENCOUNTER: ICD-10-CM

## 2024-04-25 DIAGNOSIS — I87.393 CHRONIC VENOUS HYPERTENSION (IDIOPATHIC) WITH OTHER COMPLICATIONS OF BILATERAL LOWER EXTREMITY: ICD-10-CM

## 2024-05-07 ENCOUNTER — APPOINTMENT (OUTPATIENT)
Dept: ORTHOPEDIC SURGERY | Facility: CLINIC | Age: 89
End: 2024-05-07
Payer: MEDICARE

## 2024-05-07 DIAGNOSIS — I89.0 LYMPHEDEMA, NOT ELSEWHERE CLASSIFIED: ICD-10-CM

## 2024-05-07 DIAGNOSIS — M17.12 UNILATERAL PRIMARY OSTEOARTHRITIS, LEFT KNEE: ICD-10-CM

## 2024-05-07 PROCEDURE — 20611 DRAIN/INJ JOINT/BURSA W/US: CPT | Mod: LT

## 2024-05-07 PROCEDURE — 99214 OFFICE O/P EST MOD 30 MIN: CPT | Mod: 25

## 2024-05-07 PROCEDURE — J3490M: CUSTOM | Mod: NC

## 2024-05-07 PROCEDURE — 73564 X-RAY EXAM KNEE 4 OR MORE: CPT | Mod: LT

## 2024-05-07 NOTE — PROCEDURE
[Large Joint Injection] : Large joint injection [Left] : of the left [Knee] : knee [Pain] : pain [Inflammation] : inflammation [Alcohol] : alcohol [Betadine] : betadine [Ethyl Chloride sprayed topically] : ethyl chloride sprayed topically [Sterile technique used] : sterile technique used [___ cc    1%] : Lidocaine ~Vcc of 1%  [___ cc    0.5%] : Bupivacaine (Marcaine) ~Vcc of 0.5%  [___ cc    40mg] : Triamcinolone (Kenalog) ~Vcc of 40 mg  [] : Patient tolerated procedure well [Previous OTC use and PT nontherapeutic] : patient has tried OTC's including aspirin, Ibuprofen, Aleve, etc or prescription NSAIDS, and/or exercises at home and/or physical therapy without satisfactory response [Patient had decreased mobility in the joint] : patient had decreased mobility in the joint [Risks, benefits, alternatives discussed / Verbal consent obtained] : the risks benefits, and alternatives have been discussed, and verbal consent was obtained [Morbid obesity] : morbid obesity [All ultrasound images have been permanently captured and stored accordingly in our picture archiving and communication system] : All ultrasound images have been permanently captured and stored accordingly in our picture archiving and communication system

## 2024-05-07 NOTE — DISCUSSION/SUMMARY
[de-identified] : 88f with left knee djd 1) csi left knee today - tolerated well 2) advised f/up re b/l LE swelling 3) cryotherapy, rest and activity modification  4) rtc prn   Entered by Amelia Rivero acting as scribe. Dr. Guzman- The documentation recorded by the scribe accurately reflects the service I personally performed and the decisions made by me.

## 2024-05-07 NOTE — PHYSICAL EXAM
[Left] : left knee [] : uses walker [FreeTextEntry3] : b/l LE edema [TWNoteComboBox7] : flexion 90 degrees

## 2024-05-07 NOTE — HISTORY OF PRESENT ILLNESS
[de-identified] : 05/07/2024 Ms. KATERINA GALLOWAY, a 88 year old female (retired), presents today for L knee pain.  Reports long history of b/l knee pain and b/l LE swelling/edema. Last seen in 2021 treated with csi. Reports that recently she had increased difficulty walking, went to an ER and was sent to rehab for 4 weeks. Follows with wound care, but states she is not following with anyone re: b/l LE swelling. No recent falls or trauma.

## 2025-03-24 ENCOUNTER — APPOINTMENT (OUTPATIENT)
Dept: ORTHOPEDIC SURGERY | Facility: CLINIC | Age: 89
End: 2025-03-24
Payer: MEDICARE

## 2025-03-24 VITALS — HEIGHT: 67 IN

## 2025-03-24 DIAGNOSIS — M17.11 UNILATERAL PRIMARY OSTEOARTHRITIS, RIGHT KNEE: ICD-10-CM

## 2025-03-24 DIAGNOSIS — M17.12 UNILATERAL PRIMARY OSTEOARTHRITIS, LEFT KNEE: ICD-10-CM

## 2025-03-24 PROCEDURE — J3490M: CUSTOM | Mod: NC,JZ

## 2025-03-24 PROCEDURE — 99214 OFFICE O/P EST MOD 30 MIN: CPT | Mod: 25

## 2025-03-24 PROCEDURE — 73564 X-RAY EXAM KNEE 4 OR MORE: CPT | Mod: 50

## 2025-03-24 PROCEDURE — 20611 DRAIN/INJ JOINT/BURSA W/US: CPT | Mod: 50

## 2025-04-10 ENCOUNTER — APPOINTMENT (OUTPATIENT)
Dept: ORTHOPEDIC SURGERY | Facility: CLINIC | Age: 89
End: 2025-04-10

## 2025-05-20 NOTE — REASON FOR VISIT
[No Acute Distress] : no acute distress [Well Developed] : well developed [Normal Sclera/Conjunctiva] : normal sclera/conjunctiva [EOMI] : extraocular movements intact [No JVD] : no jugular venous distention [No Lymphadenopathy] : no lymphadenopathy [Supple] : supple [Normal Rate] : normal rate  [Regular Rhythm] : with a regular rhythm [Normal S1, S2] : normal S1 and S2 [Soft] : abdomen soft [Non Tender] : non-tender [No HSM] : no HSM [Normal Bowel Sounds] : normal bowel sounds [de-identified] : 1+ pitting edema of both ankles  [Consultation] : a consultation visit [FreeTextEntry1] : right leg ulcer